# Patient Record
Sex: MALE | Race: BLACK OR AFRICAN AMERICAN | Employment: UNEMPLOYED | URBAN - METROPOLITAN AREA
[De-identification: names, ages, dates, MRNs, and addresses within clinical notes are randomized per-mention and may not be internally consistent; named-entity substitution may affect disease eponyms.]

---

## 2019-06-24 PROBLEM — R52 GENERALIZED PAIN: Status: ACTIVE | Noted: 2019-06-24

## 2019-06-24 PROBLEM — M47.12 CERVICAL SPONDYLOSIS WITH MYELOPATHY: Status: ACTIVE | Noted: 2018-10-19

## 2019-06-24 PROBLEM — R10.9 ABDOMINAL PAIN: Status: ACTIVE | Noted: 2019-06-24

## 2019-06-24 PROBLEM — E05.90 HYPERTHYROIDISM: Status: ACTIVE | Noted: 2019-06-24

## 2022-03-18 PROBLEM — M47.12 CERVICAL SPONDYLOSIS WITH MYELOPATHY: Status: ACTIVE | Noted: 2018-10-19

## 2022-03-18 PROBLEM — R10.9 ABDOMINAL PAIN: Status: ACTIVE | Noted: 2019-06-24

## 2022-03-19 PROBLEM — E05.90 HYPERTHYROIDISM: Status: ACTIVE | Noted: 2019-06-24

## 2022-03-19 PROBLEM — R52 GENERALIZED PAIN: Status: ACTIVE | Noted: 2019-06-24

## 2022-06-16 ENCOUNTER — TRANSCRIBE ORDER (OUTPATIENT)
Dept: SCHEDULING | Age: 67
End: 2022-06-16

## 2022-06-16 DIAGNOSIS — H90.3 SENSORY HEARING LOSS, BILATERAL: Primary | ICD-10-CM

## 2022-06-24 ENCOUNTER — HOSPITAL ENCOUNTER (OUTPATIENT)
Dept: LAB | Age: 67
Discharge: HOME | End: 2022-06-24
Attending: PHYSICIAN ASSISTANT
Payer: MEDICARE

## 2022-06-24 ENCOUNTER — HOSPITAL ENCOUNTER (OUTPATIENT)
Dept: MRI IMAGING | Age: 67
Discharge: HOME | End: 2022-06-24
Attending: PHYSICIAN ASSISTANT
Payer: MEDICARE

## 2022-06-24 ENCOUNTER — TRANSCRIBE ORDER (OUTPATIENT)
Dept: REGISTRATION | Age: 67
End: 2022-06-24

## 2022-06-24 DIAGNOSIS — H90.3 SENSORY HEARING LOSS, BILATERAL: ICD-10-CM

## 2022-06-24 DIAGNOSIS — H90.3 SENSORY HEARING LOSS, BILATERAL: Primary | ICD-10-CM

## 2022-06-24 LAB — CREAT SERPL-MCNC: 1.23 MG/DL (ref 0.6–1.3)

## 2022-06-24 PROCEDURE — 82565 ASSAY OF CREATININE: CPT

## 2022-06-24 PROCEDURE — 36415 COLL VENOUS BLD VENIPUNCTURE: CPT

## 2022-06-24 PROCEDURE — 74011250636 HC RX REV CODE- 250/636: Performed by: PHYSICIAN ASSISTANT

## 2022-06-24 PROCEDURE — 70553 MRI BRAIN STEM W/O & W/DYE: CPT

## 2022-06-24 PROCEDURE — A9576 INJ PROHANCE MULTIPACK: HCPCS | Performed by: PHYSICIAN ASSISTANT

## 2022-06-24 RX ADMIN — GADOTERIDOL 15 ML: 279.3 INJECTION, SOLUTION INTRAVENOUS at 14:20

## 2022-08-26 ENCOUNTER — APPOINTMENT (OUTPATIENT)
Dept: VASCULAR SURGERY | Age: 67
End: 2022-08-26
Attending: EMERGENCY MEDICINE
Payer: MEDICARE

## 2022-08-26 ENCOUNTER — HOSPITAL ENCOUNTER (EMERGENCY)
Age: 67
Discharge: HOME | End: 2022-08-26
Attending: EMERGENCY MEDICINE
Payer: MEDICARE

## 2022-08-26 VITALS
WEIGHT: 154 LBS | DIASTOLIC BLOOD PRESSURE: 66 MMHG | TEMPERATURE: 98.6 F | OXYGEN SATURATION: 99 % | RESPIRATION RATE: 16 BRPM | HEART RATE: 63 BPM | BODY MASS INDEX: 21.56 KG/M2 | SYSTOLIC BLOOD PRESSURE: 131 MMHG | HEIGHT: 71 IN

## 2022-08-26 DIAGNOSIS — M79.605 LEFT LEG PAIN: Primary | ICD-10-CM

## 2022-08-26 PROCEDURE — 93971 EXTREMITY STUDY: CPT

## 2022-08-26 PROCEDURE — 99284 EMERGENCY DEPT VISIT MOD MDM: CPT

## 2022-08-26 NOTE — ED TRIAGE NOTES
PT arrived to ER with C/O left leg swelling and redness. PT reports pain level of 7/10. Symptoms began Wednesday. PT had neck surgery in [place] Monday. Spoke with Surgeon and was advised to come to ER to R/O blood clot.

## 2022-08-26 NOTE — ED PROVIDER NOTES
EMERGENCY DEPARTMENT HISTORY AND PHYSICAL EXAM      Date: 8/26/2022  Patient Name: Tavon York    History of Presenting Illness     Chief Complaint   Patient presents with    Leg Pain       History Provided By: Patient    HPI: Tavon York, 78 y.o. male prostate cancer, chronic back pain, hypothyroidism, recent neck surgery, and patient presents to ED complaining of left leg swelling and pain since yesterday. He however states that the swelling and pain improved today but he called his surgeon who advised him to come to ER for evaluation for blood clot. He has no shortness of breath or chest pain. He denies any trauma to the leg. There are no other complaints, changes, or physical findings at this time. PCP: Noah Mirza MD    No current facility-administered medications on file prior to encounter. Current Outpatient Medications on File Prior to Encounter   Medication Sig Dispense Refill    traZODone (DESYREL) 50 mg tablet take 1 tablet by mouth at bedtime if needed for sleep      tadalafiL (CIALIS) 20 mg tablet Take 1 Tablet by mouth as needed for Erectile Dysfunction. Take no more than 1 pill in 24 hours. 30 Tablet 2    HYDROcodone-acetaminophen (NORCO)  mg tablet Take 1 Tablet by mouth every six (6) hours as needed. gabapentin (NEURONTIN) 300 mg capsule Take 300 mg by mouth three (3) times daily. tamsulosin (FLOMAX) 0.4 mg capsule take 1 capsule by mouth at bedtime      lubiPROStone (AMITIZA) 24 mcg capsule Take 24 mcg by mouth two (2) times daily (with meals). Linzess 145 mcg cap capsule take 1 capsule by mouth AT LEAST 30 MINUTES BEFORE THE FIRST MEAL OF THE DAY ON AN EMPTY STOMACH      mirabegron ER (Myrbetriq) 50 mg ER tablet Take 1 Tablet by mouth daily. 30 Tablet 3    cyclobenzaprine (FLEXERIL) 10 mg tablet Take 10 mg by mouth three (3) times daily as needed.       ergocalciferol (ERGOCALCIFEROL) 1,250 mcg (50,000 unit) capsule Take 50,000 Units by mouth every seven (7) days. famotidine (PEPCID) 20 mg tablet famotidine 20 mg tablet      fluticasone propionate (FLONASE) 50 mcg/actuation nasal spray fluticasone propionate 50 mcg/actuation nasal spray,suspension      doxazosin (CARDURA) 4 mg tablet take 1/2 tablet once daily 90 Tab 0    metoprolol succinate (TOPROL-XL) 25 mg XL tablet metoprolol succinate ER 25 mg tablet,extended release 24 hr      aspirin delayed-release 81 mg tablet Take  by mouth daily. omeprazole (PRILOSEC) 20 mg capsule Take 20 mg by mouth daily. levothyroxine (SYNTHROID) 75 mcg tablet Take  by mouth Daily (before breakfast). [DISCONTINUED] magnesium hydroxide (MILK OF MAGNESIA) 400 mg/5 mL suspension Take 30 mL by mouth every six (6) hours as needed. [DISCONTINUED] methylPREDNISolone (MEDROL) 4 mg tab methylprednisolone 4 mg tablet      [DISCONTINUED] traMADol (ULTRAM) 50 mg tablet Take 50 mg by mouth every six (6) hours as needed for Pain. Past History     Past Medical History:  Past Medical History:   Diagnosis Date    Back pain     Hypothyroidism     Prostate cancer ([facility]      pT2c madelaine 3 + 3 adenocarcinoma of the prostate s/p DVP 5/20/13        Past Surgical History:  Past Surgical History:   Procedure Laterality Date    HX BACK SURGERY  2008    HX OTHER SURGICAL  10/2018    Neck    HX RADICAL PROSTATECTOMY  5/20/13    DVP       Family History:  Family History   Problem Relation Age of Onset    Heart Disease Father     Cancer Maternal Grandmother         pancreatic    Cancer Maternal Grandfather         lung    Heart Disease Paternal Grandfather     Cancer Sister         colon       Social History:  Social History     Tobacco Use    Smoking status: Every Day    Smokeless tobacco: Never   Substance Use Topics    Alcohol use:  Yes     Alcohol/week: 18.0 standard drinks     Types: 18 Cans of beer per week     Comment: occasionally    Drug use: No       Allergies:  No Known Allergies    Review of Systems Review of Systems   All other systems reviewed and are negative. Physical Exam   Physical Exam  Vitals and nursing note reviewed. Constitutional:       General: He is not in acute distress. Appearance: He is well-developed. He is not diaphoretic. Comments: Appears in no acute distress with a soft neck collar in position. HENT:      Head: Normocephalic and atraumatic. No right periorbital erythema or left periorbital erythema. Jaw: No trismus. Right Ear: External ear normal. No drainage or swelling. Tympanic membrane is not perforated, erythematous or bulging. Left Ear: External ear normal. No drainage or swelling. Tympanic membrane is not perforated, erythematous or bulging. Nose: Nose normal. No mucosal edema or rhinorrhea. Right Sinus: No maxillary sinus tenderness or frontal sinus tenderness. Left Sinus: No maxillary sinus tenderness or frontal sinus tenderness. Mouth/Throat:      Mouth: No oral lesions. Dentition: No dental abscesses. Pharynx: Uvula midline. No oropharyngeal exudate, posterior oropharyngeal erythema or uvula swelling. Tonsils: No tonsillar abscesses. Eyes:      General: No scleral icterus. Right eye: No discharge. Left eye: No discharge. Conjunctiva/sclera: Conjunctivae normal.   Cardiovascular:      Rate and Rhythm: Normal rate and regular rhythm. Heart sounds: Normal heart sounds. No murmur heard. No friction rub. No gallop. Pulmonary:      Effort: Pulmonary effort is normal. No tachypnea, accessory muscle usage or respiratory distress. Breath sounds: Normal breath sounds. No decreased breath sounds, wheezing, rhonchi or rales. Abdominal:      General: Bowel sounds are normal. There is no distension. Palpations: Abdomen is soft. Tenderness: There is no abdominal tenderness. Musculoskeletal:         General: Swelling present. No tenderness. Normal range of motion.       Cervical back: Normal range of motion and neck supple. Comments: Left knee with minimal swelling no tenderness. Minimal tenderness to left thigh. Shirlyn Pages' sign is negative. Neurovascularly left lower extremity intact. Lymphadenopathy:      Cervical: No cervical adenopathy. Skin:     General: Skin is warm and dry. Neurological:      Mental Status: He is alert and oriented to person, place, and time. Psychiatric:         Judgment: Judgment normal.       Lab and Diagnostic Study Results   Labs -   No results found for this or any previous visit (from the past 12 hour(s)). Radiologic Studies -   @lastxrresult@  CT Results  (Last 48 hours)      None          CXR Results  (Last 48 hours)      None            Medical Decision Making and ED Course   Differential Diagnosis & Medical Decision Making Provider Note:       - I am the first provider for this patient. I reviewed the vital signs, available nursing notes, past medical history, past surgical history, family history and social history. The patients presenting problems have been discussed, and they are in agreement with the care plan formulated and outlined with them. I have encouraged them to ask questions as they arise throughout their visit. Vital Signs-Reviewed the patient's vital signs. Patient Vitals for the past 12 hrs:   Temp Pulse Resp BP SpO2   08/26/22 0953 98.6 °F (37 °C) 63 16 131/66 99 %       ED Course:          Procedures     Disposition   Disposition: Condition stable  DC- Adult Discharges: All of the diagnostic tests were reviewed and questions answered. Diagnosis, care plan and treatment options were discussed. The patient understands the instructions and will follow up as directed. The patients results have been reviewed with them. They have been counseled regarding their diagnosis.   The patient verbally convey understanding and agreement of the signs, symptoms, diagnosis, treatment and prognosis and additionally agrees to follow up as recommended with their PCP in 24 - 48 hours. They also agree with the care-plan and convey that all of their questions have been answered. I have also put together some discharge instructions for them that include: 1) educational information regarding their diagnosis, 2) how to care for their diagnosis at home, as well a 3) list of reasons why they would want to return to the ED prior to their follow-up appointment, should their condition change. DISCHARGE PLAN:  1. Cannot display discharge medications since this patient is not currently admitted. 2.   Follow-up Information       Follow up With Specialties Details Why Contact Kaylah Sutton MD Family Medicine  As Scheduled [address]  [phone]            3. Return to ED if worse   4. Discharge Medication List as of 8/26/2022 11:51 AM         Remove if admitted/transferred    Diagnosis/Clinical Impression     Clinical Impression:   1. Left leg pain        Attestations: IEduar MD, am the primary clinician of record. Please note that this dictation was completed with [employer], the computer voice recognition software. Quite often unanticipated grammatical, syntax, homophones, and other interpretive errors are inadvertently transcribed by the computer software. Please disregard these errors. Please excuse any errors that have escaped final proofreading. Thank you.

## 2022-10-18 ENCOUNTER — OFFICE VISIT (OUTPATIENT)
Dept: ORTHOPEDIC SURGERY | Age: 67
End: 2022-10-18
Payer: MEDICARE

## 2022-10-18 VITALS — WEIGHT: 154 LBS | HEIGHT: 71 IN | BODY MASS INDEX: 21.56 KG/M2

## 2022-10-18 DIAGNOSIS — M79.645 BILATERAL THUMB PAIN: Primary | ICD-10-CM

## 2022-10-18 DIAGNOSIS — M18.0 OSTEOARTHRITIS OF CARPOMETACARPAL (CMC) JOINTS OF BOTH THUMBS, UNSPECIFIED OSTEOARTHRITIS TYPE: ICD-10-CM

## 2022-10-18 DIAGNOSIS — M79.644 BILATERAL THUMB PAIN: Primary | ICD-10-CM

## 2022-10-18 PROCEDURE — G8427 DOCREV CUR MEDS BY ELIG CLIN: HCPCS | Performed by: ORTHOPAEDIC SURGERY

## 2022-10-18 PROCEDURE — 1101F PT FALLS ASSESS-DOCD LE1/YR: CPT | Performed by: ORTHOPAEDIC SURGERY

## 2022-10-18 PROCEDURE — 1123F ACP DISCUSS/DSCN MKR DOCD: CPT | Performed by: ORTHOPAEDIC SURGERY

## 2022-10-18 PROCEDURE — G8432 DEP SCR NOT DOC, RNG: HCPCS | Performed by: ORTHOPAEDIC SURGERY

## 2022-10-18 PROCEDURE — G8536 NO DOC ELDER MAL SCRN: HCPCS | Performed by: ORTHOPAEDIC SURGERY

## 2022-10-18 PROCEDURE — 20604 DRAIN/INJ JOINT/BURSA W/US: CPT | Performed by: ORTHOPAEDIC SURGERY

## 2022-10-18 PROCEDURE — 3017F COLORECTAL CA SCREEN DOC REV: CPT | Performed by: ORTHOPAEDIC SURGERY

## 2022-10-18 PROCEDURE — G8420 CALC BMI NORM PARAMETERS: HCPCS | Performed by: ORTHOPAEDIC SURGERY

## 2022-10-18 PROCEDURE — 99203 OFFICE O/P NEW LOW 30 MIN: CPT | Performed by: ORTHOPAEDIC SURGERY

## 2022-10-18 RX ORDER — LIDOCAINE HYDROCHLORIDE 10 MG/ML
1 INJECTION INFILTRATION; PERINEURAL ONCE
Status: COMPLETED | OUTPATIENT
Start: 2022-10-18 | End: 2022-10-18

## 2022-10-18 RX ORDER — OXYCODONE AND ACETAMINOPHEN 5; 325 MG/1; MG/1
TABLET ORAL
COMMUNITY
Start: 2022-10-06

## 2022-10-18 RX ORDER — TRIAMCINOLONE ACETONIDE 40 MG/ML
40 INJECTION, SUSPENSION INTRA-ARTICULAR; INTRAMUSCULAR ONCE
Status: COMPLETED | OUTPATIENT
Start: 2022-10-18 | End: 2022-10-18

## 2022-10-18 RX ORDER — LORATADINE 10 MG/1
10 TABLET ORAL DAILY
COMMUNITY

## 2022-10-18 RX ORDER — OMEPRAZOLE 40 MG/1
CAPSULE, DELAYED RELEASE ORAL
COMMUNITY
Start: 2022-08-18

## 2022-10-18 RX ORDER — OMEGA-3S/DHA/EPA/FISH OIL/D3 300MG-1000
1 CAPSULE ORAL DAILY
COMMUNITY
Start: 2022-10-05

## 2022-10-18 RX ORDER — METHOCARBAMOL 500 MG/1
TABLET, FILM COATED ORAL
COMMUNITY
Start: 2022-08-23

## 2022-10-18 RX ADMIN — TRIAMCINOLONE ACETONIDE 40 MG: 40 INJECTION, SUSPENSION INTRA-ARTICULAR; INTRAMUSCULAR at 12:00

## 2022-10-18 RX ADMIN — LIDOCAINE HYDROCHLORIDE 1 ML: 10 INJECTION INFILTRATION; PERINEURAL at 12:00

## 2022-10-18 NOTE — LETTER
10/19/2022    Patient: Aniceto Segal   YOB: 1955   Date of Visit: 10/18/2022     Richerd Scheuermann, MD  [facility] [address]  Via Fax: [phone]    Dear Richerd Scheuermann, MD,      Thank you for referring Mr. Efrain Stahl to [address] [place] [address] for evaluation. My notes for this consultation are attached. If you have questions, please do not hesitate to call me. I look forward to following your patient along with you.       Sincerely,    Barbara Ricci MD

## 2022-10-18 NOTE — PATIENT INSTRUCTIONS
Foot Pain: Care Instructions  Your Care Instructions     Foot injuries that cause pain and swelling are fairly common. Almost all sports or home repair projects can cause a misstep that ends up as foot pain. Normal wear and tear, especially as you get older, also can cause foot pain. Most minor foot injuries will heal on their own, and home treatment is usually all you need to do. If you have a severe injury, you may need tests and treatment. Follow-up care is a key part of your treatment and safety. Be sure to make and go to all appointments, and call your doctor if you are having problems. It's also a good idea to know your test results and keep a list of the medicines you take. How can you care for yourself at home? Take pain medicines exactly as directed. If the doctor gave you a prescription medicine for pain, take it as prescribed. If you are not taking a prescription pain medicine, ask your doctor if you can take an over-the-counter medicine. Rest and protect your foot. Take a break from any activity that may cause pain. Put ice or a cold pack on your foot for 10 to 20 minutes at a time. Put a thin cloth between the ice and your skin. Prop up the sore foot on a pillow when you ice it or anytime you sit or lie down during the next 3 days. Try to keep it above the level of your heart. This will help reduce swelling. Your doctor may recommend that you wrap your foot with an elastic bandage. Keep your foot wrapped for as long as your doctor advises. If your doctor recommends crutches, use them as directed. Wear roomy footwear. As soon as pain and swelling end, begin gentle exercises of your foot. Your doctor can tell you which exercises will help. When should you call for help? Call 911 anytime you think you may need emergency care. For example, call if:    Your foot turns pale, white, blue, or cold.    Call your doctor now or seek immediate medical care if:    You cannot move or stand on your foot.     Your foot looks twisted or out of its normal position. Your foot is not stable when you step down. You have signs of infection, such as: Increased pain, swelling, warmth, or redness. Red streaks leading from the sore area. Pus draining from a place on your foot. A fever. Your foot is numb or tingly. Watch closely for changes in your health, and be sure to contact your doctor if:    You do not get better as expected. You have bruises from an injury that last longer than 2 weeks. Where can you learn more? Go to http://www.thurman.com/  Enter D999 in the search box to learn more about \"Foot Pain: Care Instructions. \"  Current as of: July 1, 2021               Content Version: 13.2  © 2006-2022 [employer]. Care instructions adapted under license by [employer] (which disclaims liability or warranty for this information). If you have questions about a medical condition or this instruction, always ask your healthcare professional. [place] [address] any warranty or liability for your use of this information.

## 2022-10-18 NOTE — LETTER
Destiny Busby   1955   [id number]       10/18/2022       I hereby authorize and direct Edd Mancia MD, Garcia Anthony, and whomever he may designate as his associate to perform upon myself the following procedure:    Injection of: Kenalog,bl thumb rm 6    If any unforeseen condition arises in the course of the procedure, I further authorize him and his associated and/or assistant(s) to do whatever he/she deems advisable. The nature, purpose, benefits, risks, side effects, likelihood of achieving goals, and potential problems that might occur during recuperation, risks for not receiving the proposed care, treatment and services and alternatives of the procedure have been fully explained to me by my physician including, but not limited to:    Swelling, joint pain, skin pigment changes, worsening of condition, and failure to improve. I acknowledge that no guarantee or assurance has been made to me as to the results that may be obtained or the likelihood of success.                 _______________________________________     Signature of patient or authorized representative                [employer] [facility] fax: [phone]

## 2022-10-18 NOTE — PROGRESS NOTES
Name: Felix De La Rosa    : 1955     Service Dept: [address] [place] [address]    Chief Complaint   Patient presents with    Hand Pain        Visit Vitals  Ht 5' 11\" (1.803 m)   Wt 154 lb (69.9 kg)   BMI 21.48 kg/m²        No Known Allergies     Current Outpatient Medications   Medication Sig Dispense Refill    Vitamin D3 50 mcg (2,000 unit) tab Take 1 Tablet by mouth daily. loratadine (CLARITIN) 10 mg tablet Take 10 mg by mouth daily. methocarbamoL (ROBAXIN) 500 mg tablet       oxyCODONE-acetaminophen (PERCOCET) 5-325 mg per tablet take 1 tablet by mouth every 6 hours if needed      omeprazole (PRILOSEC) 40 mg capsule TAKE 1 TABLET BY MOUTH 30 MINUTES BEFORE MORNING MEAL ONCE DAILY      traZODone (DESYREL) 50 mg tablet take 1 tablet by mouth at bedtime if needed for sleep      tadalafiL (CIALIS) 20 mg tablet Take 1 Tablet by mouth as needed for Erectile Dysfunction. Take no more than 1 pill in 24 hours. 30 Tablet 2    HYDROcodone-acetaminophen (NORCO)  mg tablet Take 1 Tablet by mouth every six (6) hours as needed. gabapentin (NEURONTIN) 300 mg capsule Take 300 mg by mouth three (3) times daily. tamsulosin (FLOMAX) 0.4 mg capsule take 1 capsule by mouth at bedtime      lubiPROStone (AMITIZA) 24 mcg capsule Take 24 mcg by mouth two (2) times daily (with meals). Linzess 145 mcg cap capsule take 1 capsule by mouth AT LEAST 30 MINUTES BEFORE THE FIRST MEAL OF THE DAY ON AN EMPTY STOMACH      mirabegron ER (Myrbetriq) 50 mg ER tablet Take 1 Tablet by mouth daily. 30 Tablet 3    cyclobenzaprine (FLEXERIL) 10 mg tablet Take 10 mg by mouth three (3) times daily as needed. ergocalciferol (ERGOCALCIFEROL) 1,250 mcg (50,000 unit) capsule Take 50,000 Units by mouth every seven (7) days.       famotidine (PEPCID) 20 mg tablet famotidine 20 mg tablet      fluticasone propionate (FLONASE) 50 mcg/actuation nasal spray fluticasone propionate 50 mcg/actuation nasal spray,suspension      doxazosin (CARDURA) 4 mg tablet take 1/2 tablet once daily 90 Tab 0    metoprolol succinate (TOPROL-XL) 25 mg XL tablet metoprolol succinate ER 25 mg tablet,extended release 24 hr      aspirin delayed-release 81 mg tablet Take  by mouth daily. omeprazole (PRILOSEC) 20 mg capsule Take 20 mg by mouth daily. levothyroxine (SYNTHROID) 75 mcg tablet Take  by mouth Daily (before breakfast). Current Facility-Administered Medications   Medication Dose Route Frequency Provider Last Rate Last Admin    [COMPLETED] triamcinolone acetonide (KENALOG-40) 40 mg/mL injection 40 mg  40 mg Other ONCE Edd Bowie MD   40 mg at 10/18/22 1200    [COMPLETED] lidocaine (XYLOCAINE) 10 mg/mL (1 %) injection 1 mL  1 mL Other ONCE Edd Bowie MD   1 mL at 10/18/22 1200      Patient Active Problem List   Diagnosis Code    [facility] C61    Abdominal pain R10.9    Cervical spondylosis with myelopathy M47.12    Generalized pain R52    Hyperthyroidism E05.90    Pneumonia, organism unspecified(486) J18.9    Sepsis ([facility] A41.9      Family History   Problem Relation Age of Onset    Heart Disease Father     Cancer Maternal Grandmother         pancreatic    Cancer Maternal Grandfather         lung    Heart Disease Paternal Grandfather     Cancer Sister         colon      Social History     Socioeconomic History    Marital status: SINGLE   Tobacco Use    Smoking status: Every Day    Smokeless tobacco: Never   Substance and Sexual Activity    Alcohol use:  Yes     Alcohol/week: 18.0 standard drinks     Types: 18 Cans of beer per week     Comment: occasionally    Drug use: No      Past Surgical History:   Procedure Laterality Date    HX BACK SURGERY  2008    HX OTHER SURGICAL  10/2018    Neck    HX RADICAL PROSTATECTOMY  5/20/13    DVP      Past Medical History:   Diagnosis Date    Back pain     Hypothyroidism     Pro[facility] ([facility]      pT2c madelaine 3 + 3 adenocarcinoma of the prostate s/p DVP 5/20/13         I have reviewed and agree with [address] [place] [address] Nw and ROS and intake form in chart and the record furthermore I have reviewed prior medical record(s) regarding this patients care during this appointment. Review of Systems:   Patient is a pleasant appearing individual, appropriately dressed, well hydrated, well nourished, who is alert, appropriately oriented for age, and in no acute distress with a normal gait and normal affect who does not appear to be in any significant pain. Physical Exam:  Right hand- Grossly neurovascularly intact with good cap refill, decreased range of motion, decreased strength, positive crepitation, minimal swelling, skin intact with no skin lesions, no erythema, no echymosis, no point tenderness. Left hand -  Grossly neurovascularly intact with good cap refill, decreased range of motion, decreased strength, positive crepitation, minimal swelling, skin intact with no skin lesions, no erythema, no echymosis, no point tenderness. Procedure Documentation:    Please note that [employer] ultrasound was used to perform an ultrasound guided injection into the bilateral fingers. A pre-injection ultrasound was taken of bilateral fingers. After the needle was placed into the lateral aspect of the finger(s) and while the kenalog was injected another ultrasound picture confirmed the appropriate placement. The site of injection, bilateral fingers was sterilely prepped. The injection of 40 mg Kenalog and Lidocaine was administered appropriately in bilateral fingers and the patient tolerated it well. No site reaction was identified. Appropriate dressing was placed. Consent was obtained for the injection. Encounter Diagnoses     ICD-10-CM ICD-9-CM   1. Sprain of right foot, initial encounter  S93.601A 845.10   2. Right foot pain  M79.671 729.5       HPI:  The patient is here with a chief complaint of bilateral hand pain, throbbing, burning pain, having some numbness and tingling. Pain is 6/10. X-rays are positive for [facility] arthritis of both hands, as well. Assessment/Plan:  Plan at this point, cortisone injection bilateral thumb CMC joint. If the injection helps, that is all we need to do. If it does not, we will consider getting an EMG. We will see him back in 2 to 3 weeks and go from there. As part of continued conservative pain management options the patient was advised to utilize Tylenol or OTC NSAIDS as long as it is not medically contraindicated. Return to Office: Follow-up and Dispositions    Return if symptoms worsen or fail to improve. Administrations This Visit       lidocaine (XYLOCAINE) 10 mg/mL (1 %) injection 1 mL       Admin Date  10/18/2022 Action  Given Dose  1 mL Route  Other Administered By  Pilar Hoffman LPN              triamcinolone acetonide (KENALOG-40) 40 mg/mL injection 40 mg       Admin Date  10/18/2022 Action  Given Dose  40 mg Route  Other Administered By  Pilar Hoffman LPN                   Scribed by Amy Feliz LPN as dictated by [facility] MAX Avelar MD.  Documentation True and Accepted Edd Avelar MD

## 2022-11-02 ENCOUNTER — HOSPITAL ENCOUNTER (EMERGENCY)
Age: 67
Discharge: HOME | End: 2022-11-02
Attending: INTERNAL MEDICINE
Payer: MEDICARE

## 2022-11-02 ENCOUNTER — APPOINTMENT (OUTPATIENT)
Dept: CT IMAGING | Age: 67
End: 2022-11-02
Attending: INTERNAL MEDICINE
Payer: MEDICARE

## 2022-11-02 VITALS
TEMPERATURE: 98.4 F | WEIGHT: 154 LBS | DIASTOLIC BLOOD PRESSURE: 68 MMHG | RESPIRATION RATE: 14 BRPM | BODY MASS INDEX: 21.56 KG/M2 | HEIGHT: 71 IN | HEART RATE: 66 BPM | OXYGEN SATURATION: 100 % | SYSTOLIC BLOOD PRESSURE: 144 MMHG

## 2022-11-02 DIAGNOSIS — R10.31 ABDOMINAL PAIN, RIGHT LOWER QUADRANT: Primary | ICD-10-CM

## 2022-11-02 DIAGNOSIS — K59.00 CONSTIPATION, UNSPECIFIED CONSTIPATION TYPE: ICD-10-CM

## 2022-11-02 LAB
APPEARANCE UR: CLEAR
BACTERIA URNS QL MICRO: NEGATIVE /HPF
BILIRUB UR QL: NEGATIVE
COLOR UR: YELLOW
GLUCOSE UR STRIP.AUTO-MCNC: NEGATIVE MG/DL
HGB UR QL STRIP: NEGATIVE
KETONES UR QL STRIP.AUTO: NEGATIVE MG/DL
LEUKOCYTE ESTERASE UR QL STRIP.AUTO: NEGATIVE
NITRITE UR QL STRIP.AUTO: NEGATIVE
PH UR STRIP: 5 [PH] (ref 5–8)
PROT UR STRIP-MCNC: ABNORMAL MG/DL
RBC #/AREA URNS HPF: ABNORMAL /HPF (ref 0–2)
SP GR UR REFRACTOMETRY: 1.01 (ref 1–1.03)
UROBILINOGEN UR QL STRIP.AUTO: 0.2 EU/DL (ref 0.2–1)
WBC URNS QL MICRO: ABNORMAL /HPF (ref 0–4)

## 2022-11-02 PROCEDURE — 99284 EMERGENCY DEPT VISIT MOD MDM: CPT

## 2022-11-02 PROCEDURE — 74176 CT ABD & PELVIS W/O CONTRAST: CPT

## 2022-11-02 PROCEDURE — 81001 URINALYSIS AUTO W/SCOPE: CPT

## 2022-11-02 RX ORDER — POLYETHYLENE GLYCOL 3350 17 G/17G
17 POWDER, FOR SOLUTION ORAL 2 TIMES DAILY
Qty: 235 G | Refills: 0 | Status: SHIPPED | OUTPATIENT
Start: 2022-11-02

## 2022-11-02 NOTE — ED NOTES
Patient up to [facility]. Small amount of formed stool with brown water in bucket.

## 2022-11-02 NOTE — ED PROVIDER NOTES
EMERGENCY DEPARTMENT HISTORY AND PHYSICAL EXAM      Date: 11/2/2022  Patient Name: Vinny Iraheta    History of Presenting Illness     Chief Complaint   Patient presents with    Constipation     BM yesterday but \" I had to strain\". History Provided By: Patient    HPI: Vinny Iraheta, 78 y.o. male with history of cervical fusion, prostate cancer  S/P RARP 5/'13; LUTS; lumbar discectomy / laminectomy, hypothyroid, GERD presents with chronic constipation. Patient states that over the last week whenever he strains to move his bowels he develops pain from the right groin into the right lower quadrant of the abdomen which stops after he no longer strains. He denies fever chills, blood in the stool, dysuria, flank pain  There are no other complaints, changes, or physical findings at this time. PCP: Cara Rodriguez MD    Current Outpatient Medications   Medication Sig Dispense Refill    polyethylene glycol (Miralax) 17 gram/dose powder Take 17 g by mouth two (2) times a day. 1 tablespoon with 8 oz of water daily 235 g 0    Vitamin D3 50 mcg (2,000 unit) tab Take 1 Tablet by mouth daily. loratadine (CLARITIN) 10 mg tablet Take 10 mg by mouth daily. methocarbamoL (ROBAXIN) 500 mg tablet       oxyCODONE-acetaminophen (PERCOCET) 5-325 mg per tablet take 1 tablet by mouth every 6 hours if needed      omeprazole (PRILOSEC) 40 mg capsule TAKE 1 TABLET BY MOUTH 30 MINUTES BEFORE MORNING MEAL ONCE DAILY      traZODone (DESYREL) 50 mg tablet take 1 tablet by mouth at bedtime if needed for sleep      tadalafiL (CIALIS) 20 mg tablet Take 1 Tablet by mouth as needed for Erectile Dysfunction. Take no more than 1 pill in 24 hours. 30 Tablet 2    HYDROcodone-acetaminophen (NORCO)  mg tablet Take 1 Tablet by mouth every six (6) hours as needed. gabapentin (NEURONTIN) 300 mg capsule Take 300 mg by mouth three (3) times daily.       tamsulosin (FLOMAX) 0.4 mg capsule take 1 capsule by mouth at bedtime lubiPROStone (AMITIZA) 24 mcg capsule Take 24 mcg by mouth two (2) times daily (with meals). Linzess 145 mcg cap capsule take 1 capsule by mouth AT LEAST 30 MINUTES BEFORE THE FIRST MEAL OF THE DAY ON AN EMPTY STOMACH      mirabegron ER (Myrbetriq) 50 mg ER tablet Take 1 Tablet by mouth daily. 30 Tablet 3    cyclobenzaprine (FLEXERIL) 10 mg tablet Take 10 mg by mouth three (3) times daily as needed. ergocalciferol (ERGOCALCIFEROL) 1,250 mcg (50,000 unit) capsule Take 50,000 Units by mouth every seven (7) days. famotidine (PEPCID) 20 mg tablet famotidine 20 mg tablet      fluticasone propionate (FLONASE) 50 mcg/actuation nasal spray fluticasone propionate 50 mcg/actuation nasal spray,suspension      doxazosin (CARDURA) 4 mg tablet take 1/2 tablet once daily 90 Tab 0    metoprolol succinate (TOPROL-XL) 25 mg XL tablet metoprolol succinate ER 25 mg tablet,extended release 24 hr      aspirin delayed-release 81 mg tablet Take  by mouth daily. omeprazole (PRILOSEC) 20 mg capsule Take 20 mg by mouth daily. levothyroxine (SYNTHROID) 75 mcg tablet Take  by mouth Daily (before breakfast).          Past History   Past Medical History:  Past Medical History:   Diagnosis Date    Back pain     Hypothyroidism     Prostate cancer ([facility] [address]      pT2c madelaine 3 + 3 adenocarcinoma of the prostate s/p DVP 5/20/13        Past Surgical History:  Past Surgical History:   Procedure Laterality Date    HX BACK SURGERY  2008    HX OTHER SURGICAL  10/2018    Neck    HX RADICAL PROSTATECTOMY  5/20/13    DVP       Family History:  Family History   Problem Relation Age of Onset    Heart Disease Father     Cancer Maternal Grandmother         pancreatic    Cancer Maternal Grandfather         lung    Heart Disease Paternal Grandfather     Cancer Sister         colon       Social History:  Social History     Tobacco Use    Smoking status: Every Day     Packs/day: 0.50     Types: Cigarettes    Smokeless tobacco: Never Substance Use Topics    Alcohol use: Yes     Alcohol/week: 18.0 standard drinks     Types: 18 Cans of beer per week     Comment: occasionally    Drug use: No       Allergies:  No Known Allergies  Review of Systems   Review of Systems   Constitutional:  Negative for chills and fever. HENT:  Negative for sore throat. Respiratory:  Negative for shortness of breath. Cardiovascular:  Negative for chest pain. Gastrointestinal:  Positive for abdominal pain. Negative for nausea and vomiting. Genitourinary:  Negative for dysuria, flank pain and frequency. Skin:  Negative for rash. Neurological:  Negative for headaches. Psychiatric/Behavioral:  Negative for confusion. Physical Exam   Physical Exam  Vitals and nursing note reviewed. Constitutional:       Appearance: He is well-developed. He is not diaphoretic. HENT:      Head: Normocephalic. Mouth/Throat:      Pharynx: No oropharyngeal exudate. Eyes:      Extraocular Movements: Extraocular movements intact. Conjunctiva/sclera: Conjunctivae normal.   Cardiovascular:      Rate and Rhythm: Normal rate and regular rhythm. Heart sounds: Normal heart sounds. No murmur heard. Pulmonary:      Effort: Pulmonary effort is normal. No respiratory distress. Breath sounds: No wheezing. Abdominal:      General: Bowel sounds are normal. There is no distension. Palpations: Abdomen is soft. Tenderness: There is no abdominal tenderness. There is no guarding or rebound. Genitourinary:     Comments: Left testicle is normal but the right testicle is atrophic, no hernia  Musculoskeletal:         General: Normal range of motion. Cervical back: Normal range of motion and neck supple. Right lower leg: No edema. Left lower leg: No edema. Skin:     General: Skin is warm and dry. Neurological:      Mental Status: He is alert and oriented to person, place, and time.        Lab and Diagnostic Study Results   Labs - Recent Results (from the past 12 hour(s))   URINALYSIS W/ RFLX MICROSCOPIC    Collection Time: 11/02/22 10:25 AM   Result Value Ref Range    Color Yellow      Appearance Clear      Specific gravity 1.014 1.005 - 1.030      pH (UA) 5.0 5.0 - 8.0      Protein Trace (A) Negative mg/dL    Glucose Negative Negative mg/dL    Ketone Negative Negative mg/dL    Bilirubin Negative Negative      Blood Negative Negative      Urobilinogen 0.2 0.2 - 1.0 EU/dL    Nitrites Negative Negative      Leukocyte Esterase Negative Negative     URINE MICROSCOPIC    Collection Time: 11/02/22 10:25 AM   Result Value Ref Range    WBC 0-4 0 - 4 /hpf    RBC 0-5 0 - 2 /hpf    Bacteria Negative (A) None /hpf       Radiologic Studies -   [unfilled]  CT Results  (Last 48 hours)                 11/02/22 1146  CT ABD PELV WO CONT Final result    Impression:      1. No clearly acute intra-abdominal or pelvic abnormality demonstrated. 2. 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 3. Small hiatal hernia. 4. Cholelithiasis. No evidence of cholecystitis. Narrative:  EXAM: CT of the abdomen and pelvis       INDICATION: Right lower quadrant abdominal pain       COMPARISON: None. TECHNIQUE: Axial CT imaging of the abdomen and pelvis was performed without   intravenous contrast. Multiplanar reformats were generated. One or more dose reduction techniques were used on this CT: automated exposure   control, adjustment of the mAs and/or kVp according to patient size, and   iterative reconstruction techniques. The specific techniques used on this CT   exam have been documented in the patient's electronic medical record.   Digital   Imaging and Communications in Medicine (DICOM) format image data are available   to nonaffiliated external healthcare facilities or entities on a secure, media   free, reciprocally searchable basis with patient authorization for at least a   12-month period after this study. _______________       FINDINGS:       LOWER CHEST: Mild bibasilar atelectasis. No consolidation or pleural   abnormality. LIVER, BILIARY: Liver is normal. No biliary dilation. Punctate gallstone within   a nondistended gallbladder. PANCREAS: Normal as visualized. SPLEEN: Normal.       ADRENALS: Normal.       KIDNEYS/URETERS/BLADDER: No evidence of hydronephrosis. No nephrolithiasis. No   suspicious renal lesion. Urinary bladder mildly thick-walled, otherwise   unremarkable. PELVIC ORGANS: Unremarkable. VASCULATURE: Unremarkable       LYMPH NODES: No enlarged lymph nodes. GASTROINTESTINAL TRACT: No bowel dilation or wall thickening. Small hiatal   hernia. Normal appendix. Multiple colonic diverticula without evidence of   diverticulitis. BONES: Prior posterior instrumented thoracolumbar fusion procedure with   bilateral iliac bolt extension. Vertebral body heights are within normal limits. Bilateral femoral head avascular necrosis with advanced secondary osteoarthritis   left hip joint. OTHER: None.       _______________                 CXR Results  (Last 48 hours)      None            Medical Decision Making and ED Course   Differential Diagnosis & Medical Decision Making Provider Note:   Differential Diagnosis: Constipation, food poisoning, ulcer disease, Gall stones, abdominal aneurysm, mesenteric ischemia, bowel obstruction, biliary disease, pancreatitis, kidney stones, PID, IBD, appendicitis, UTI, renal colic, diverticulitis, colitis, hepatitis, angina, pneumonia, reflux, perforation, testicular torsion.        - I am the first and primary provider for this patient. I reviewed the vital signs, available nursing notes, past medical history, past surgical history, family history and social history.  The patient's presenting problems have been discussed, and the staff are in agreement with the care plan formulated and outlined with them. I have encouraged them to ask questions as they arise throughout their visit. Vital Signs-Reviewed the patient's vital signs. Patient Vitals for the past 12 hrs:   Temp Pulse Resp BP SpO2   11/02/22 1035 98.4 °F (36.9 °C) 62 14 (!) 161/72 100 %       EKG interpretation: (Preliminary): EKG Interpreted by me. Shows     ED Course: FOLLOW UP WITH PCP     Procedures and Critical Care         Disposition   Disposition: DISCHARGE    DISCHARGE PLAN:  1. Current Discharge Medication List        CONTINUE these medications which have NOT CHANGED    Details   Vitamin D3 50 mcg (2,000 unit) tab Take 1 Tablet by mouth daily. loratadine (CLARITIN) 10 mg tablet Take 10 mg by mouth daily. methocarbamoL (ROBAXIN) 500 mg tablet       oxyCODONE-acetaminophen (PERCOCET) 5-325 mg per tablet take 1 tablet by mouth every 6 hours if needed      !! omeprazole (PRILOSEC) 40 mg capsule TAKE 1 TABLET BY MOUTH 30 MINUTES BEFORE MORNING MEAL ONCE DAILY      traZODone (DESYREL) 50 mg tablet take 1 tablet by mouth at bedtime if needed for sleep      tadalafiL (CIALIS) 20 mg tablet Take 1 Tablet by mouth as needed for Erectile Dysfunction. Take no more than 1 pill in 24 hours. Qty: 30 Tablet, Refills: 2      HYDROcodone-acetaminophen (NORCO)  mg tablet Take 1 Tablet by mouth every six (6) hours as needed. gabapentin (NEURONTIN) 300 mg capsule Take 300 mg by mouth three (3) times daily. tamsulosin (FLOMAX) 0.4 mg capsule take 1 capsule by mouth at bedtime      lubiPROStone (AMITIZA) 24 mcg capsule Take 24 mcg by mouth two (2) times daily (with meals). Linzess 145 mcg cap capsule take 1 capsule by mouth AT LEAST 30 MINUTES BEFORE THE FIRST MEAL OF THE DAY ON AN EMPTY STOMACH      mirabegron ER (Myrbetriq) 50 mg ER tablet Take 1 Tablet by mouth daily. Qty: 30 Tablet, Refills: 3      cyclobenzaprine (FLEXERIL) 10 mg tablet Take 10 mg by mouth three (3) times daily as needed. ergocalciferol (ERGOCALCIFEROL) 1,250 mcg (50,000 unit) capsule Take 50,000 Units by mouth every seven (7) days. famotidine (PEPCID) 20 mg tablet famotidine 20 mg tablet      fluticasone propionate (FLONASE) 50 mcg/actuation nasal spray fluticasone propionate 50 mcg/actuation nasal spray,suspension      doxazosin (CARDURA) 4 mg tablet take 1/2 tablet once daily  Qty: 90 Tab, Refills: 0      metoprolol succinate (TOPROL-XL) 25 mg XL tablet metoprolol succinate ER 25 mg tablet,extended release 24 hr      aspirin delayed-release 81 mg tablet Take  by mouth daily. !! omeprazole (PRILOSEC) 20 mg capsule Take 20 mg by mouth daily. levothyroxine (SYNTHROID) 75 mcg tablet Take  by mouth Daily (before breakfast). !! - Potential duplicate medications found. Please discuss with provider. 2.   Follow-up Information       Follow up With Specialties Details Why Contact Info    Kristy Brown MD Internal Medicine Physician Schedule an appointment as soon as possible for a visit in 2 days  [address] [place] [address] [address] [place] [address] Ave  [phone]            3. Return to ED if worse   4. Current Discharge Medication List        START taking these medications    Details   polyethylene glycol (Miralax) 17 gram/dose powder Take 17 g by mouth two (2) times a day. 1 tablespoon with 8 oz of water daily  Qty: 235 g, Refills: 0  Start date: 11/2/2022               Diagnosis/Clinical Impression     Clinical Impression:   1. Abdominal pain, right lower quadrant    2. Constipation, unspecified constipation type        Attestations: Michael Ramirez MD, am the primary clinician of record. Please note that this dictation was completed with [employer], the [employer] voice recognition software. Quite often unanticipated grammatical, syntax, homophones, and other interpretive errors are inadvertently transcribed by the computer software. Please disregard these errors.   Please excuse any errors that have escaped final proofreading. Thank you.

## 2022-11-02 NOTE — ED TRIAGE NOTES
Reports constipation but also states that he had a normal BM yesterday. Reports \"straining\". Very vague symptoms.

## 2022-12-13 ENCOUNTER — APPOINTMENT (OUTPATIENT)
Dept: CT IMAGING | Age: 67
End: 2022-12-13
Attending: EMERGENCY MEDICINE
Payer: MEDICARE

## 2022-12-13 ENCOUNTER — APPOINTMENT (OUTPATIENT)
Dept: GENERAL RADIOLOGY | Age: 67
End: 2022-12-13
Attending: EMERGENCY MEDICINE
Payer: MEDICARE

## 2022-12-13 ENCOUNTER — HOSPITAL ENCOUNTER (EMERGENCY)
Age: 67
Discharge: HOME | End: 2022-12-13
Attending: EMERGENCY MEDICINE
Payer: MEDICARE

## 2022-12-13 VITALS
HEIGHT: 71 IN | OXYGEN SATURATION: 97 % | HEART RATE: 67 BPM | TEMPERATURE: 98.2 F | BODY MASS INDEX: 21.84 KG/M2 | DIASTOLIC BLOOD PRESSURE: 85 MMHG | RESPIRATION RATE: 16 BRPM | WEIGHT: 156 LBS | SYSTOLIC BLOOD PRESSURE: 147 MMHG

## 2022-12-13 DIAGNOSIS — S32.010A CLOSED COMPRESSION FRACTURE OF BODY OF L1 VERTEBRA (HCC): ICD-10-CM

## 2022-12-13 DIAGNOSIS — S40.012A CONTUSION OF LEFT SHOULDER, INITIAL ENCOUNTER: Primary | ICD-10-CM

## 2022-12-13 DIAGNOSIS — M47.816 OSTEOARTHRITIS OF LUMBAR SPINE, UNSPECIFIED SPINAL OSTEOARTHRITIS COMPLICATION STATUS: ICD-10-CM

## 2022-12-13 PROCEDURE — 99284 EMERGENCY DEPT VISIT MOD MDM: CPT

## 2022-12-13 PROCEDURE — 74011250636 HC RX REV CODE- 250/636: Performed by: EMERGENCY MEDICINE

## 2022-12-13 PROCEDURE — 73030 X-RAY EXAM OF SHOULDER: CPT

## 2022-12-13 PROCEDURE — 72131 CT LUMBAR SPINE W/O DYE: CPT

## 2022-12-13 PROCEDURE — 96372 THER/PROPH/DIAG INJ SC/IM: CPT

## 2022-12-13 PROCEDURE — 72100 X-RAY EXAM L-S SPINE 2/3 VWS: CPT

## 2022-12-13 RX ORDER — LIDOCAINE 4 G/100G
PATCH TOPICAL
Qty: 10 PATCH | Refills: 0 | Status: SHIPPED | OUTPATIENT
Start: 2022-12-13

## 2022-12-13 RX ORDER — IBUPROFEN 400 MG/1
400 TABLET ORAL
Qty: 20 TABLET | Refills: 0 | Status: SHIPPED | OUTPATIENT
Start: 2022-12-13

## 2022-12-13 RX ORDER — CYCLOBENZAPRINE HCL 5 MG
5 TABLET ORAL 2 TIMES DAILY
Qty: 10 TABLET | Refills: 0 | Status: SHIPPED | OUTPATIENT
Start: 2022-12-13

## 2022-12-13 RX ORDER — KETOROLAC TROMETHAMINE 30 MG/ML
30 INJECTION, SOLUTION INTRAMUSCULAR; INTRAVENOUS
Status: COMPLETED | OUTPATIENT
Start: 2022-12-13 | End: 2022-12-13

## 2022-12-13 RX ADMIN — KETOROLAC TROMETHAMINE 30 MG: 30 INJECTION, SOLUTION INTRAMUSCULAR; INTRAVENOUS at 17:53
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
## 2023-01-17 ENCOUNTER — OFFICE VISIT (OUTPATIENT)
Dept: ORTHOPEDIC SURGERY | Age: 68
End: 2023-01-17
Payer: MEDICARE

## 2023-01-17 DIAGNOSIS — M19.012 ARTHRITIS OF LEFT SHOULDER REGION: ICD-10-CM

## 2023-01-17 DIAGNOSIS — M25.512 LEFT SHOULDER PAIN, UNSPECIFIED CHRONICITY: Primary | ICD-10-CM

## 2023-01-17 RX ORDER — LIDOCAINE HYDROCHLORIDE 10 MG/ML
9 INJECTION INFILTRATION; PERINEURAL ONCE
Status: COMPLETED | OUTPATIENT
Start: 2023-01-17 | End: 2023-01-17

## 2023-01-17 RX ORDER — TRIAMCINOLONE ACETONIDE 40 MG/ML
40 INJECTION, SUSPENSION INTRA-ARTICULAR; INTRAMUSCULAR ONCE
Status: COMPLETED | OUTPATIENT
Start: 2023-01-17 | End: 2023-01-17

## 2023-01-17 RX ORDER — TIZANIDINE 2 MG/1
TABLET ORAL
COMMUNITY
Start: 2022-12-27

## 2023-01-17 RX ORDER — CELECOXIB 200 MG/1
CAPSULE ORAL
COMMUNITY
Start: 2022-12-21

## 2023-01-17 RX ADMIN — LIDOCAINE HYDROCHLORIDE 9 ML: 10 INJECTION INFILTRATION; PERINEURAL at 16:00

## 2023-01-17 RX ADMIN — TRIAMCINOLONE ACETONIDE 40 MG: 40 INJECTION, SUSPENSION INTRA-ARTICULAR; INTRAMUSCULAR at 16:00

## 2023-01-17 NOTE — PATIENT INSTRUCTIONS

## 2023-01-17 NOTE — LETTER
Elizabeth Riley   1955   [id number]       1/17/2023       I hereby authorize and direct Edd Rose MD, Yen Haywood, and whomever he may designate as his associate to perform upon myself the following procedure:    Injection of: Kenalog, Supartz, Euflexxa, Orthovisc in the Right/Left ____________________. If any unforeseen condition arises in the course of the procedure, I further authorize him and his associated and/or assistant(s) to do whatever he/she deems advisable. The nature, purpose, benefits, risks, side effects, likelihood of achieving goals, and potential problems that might occur during recuperation, risks for not receiving the proposed care, treatment and services and alternatives of the procedure have been fully explained to me by my physician including, but not limited to:    Swelling, joint pain, skin pigment changes, worsening of condition, and failure to improve. I acknowledge that no guarantee or assurance has been made to me as to the results that may be obtained or the likelihood of success.                 _______________________________________     Signature of patient or authorized representative                [employer] [facility] fax: [phone]     Room 5

## 2023-01-17 NOTE — LETTER
1/18/2023    Patient: Astrid Hogue   YOB: 1955   Date of Visit: 1/17/2023     Yamilet Mcdonnell MD  [address] [address]  Via Fax: [phone]    Dear Yamilet Mcdonnell MD,      Thank you for referring Mr. Shira Middleton to [address] [place] [address] for evaluation. My notes for this consultation are attached. If you have questions, please do not hesitate to call me. I look forward to following your patient along with you.       Sincerely,    Rome Narayanan MD

## 2023-01-17 NOTE — PROGRESS NOTES
Name: Andrew Mcbride    : 1955     Service Dept: [address] [place] [address]    Chief Complaint   Patient presents with    Shoulder Pain        There were no vitals taken for this visit. No Known Allergies     Current Outpatient Medications   Medication Sig Dispense Refill    celecoxib (CELEBREX) 200 mg capsule TAKE 1 CAPSULE BY MOUTH TWICE DAILY WITH FOOD AS NEEDED FOR SHOULDER PAIN      tiZANidine (ZANAFLEX) 2 mg tablet TAKE 1 TABLET BY MOUTH THREE TIMES DAILY AS NEEDED FOR SPASMS OR PAIN      lidocaine 4 % patch 1 patch q 12 hours for pain  Indications: Pain 10 Patch 0    cyclobenzaprine (FLEXERIL) 5 mg tablet Take 1 Tablet by mouth two (2) times a day. 10 Tablet 0    ibuprofen (MOTRIN) 400 mg tablet Take 1 Tablet by mouth every six (6) hours as needed for Pain. 20 Tablet 0    polyethylene glycol (Miralax) 17 gram/dose powder Take 17 g by mouth two (2) times a day. 1 tablespoon with 8 oz of water daily 235 g 0    Vitamin D3 50 mcg (2,000 unit) tab Take 1 Tablet by mouth daily. loratadine (CLARITIN) 10 mg tablet Take 10 mg by mouth daily. methocarbamoL (ROBAXIN) 500 mg tablet       oxyCODONE-acetaminophen (PERCOCET) 5-325 mg per tablet take 1 tablet by mouth every 6 hours if needed      omeprazole (PRILOSEC) 40 mg capsule TAKE 1 TABLET BY MOUTH 30 MINUTES BEFORE MORNING MEAL ONCE DAILY      traZODone (DESYREL) 50 mg tablet take 1 tablet by mouth at bedtime if needed for sleep      tadalafiL (CIALIS) 20 mg tablet Take 1 Tablet by mouth as needed for Erectile Dysfunction. Take no more than 1 pill in 24 hours. 30 Tablet 2    HYDROcodone-acetaminophen (NORCO)  mg tablet Take 1 Tablet by mouth every six (6) hours as needed. gabapentin (NEURONTIN) 300 mg capsule Take 300 mg by mouth three (3) times daily.       tamsulosin (FLOMAX) 0.4 mg capsule take 1 capsule by mouth at bedtime      lubiPROStone (AMITIZA) 24 mcg capsule Take 24 mcg by mouth two (2) times daily (with meals). Linzess 145 mcg cap capsule take 1 capsule by mouth AT LEAST 30 MINUTES BEFORE THE FIRST MEAL OF THE DAY ON AN EMPTY STOMACH      mirabegron ER (Myrbetriq) 50 mg ER tablet Take 1 Tablet by mouth daily. 30 Tablet 3    cyclobenzaprine (FLEXERIL) 10 mg tablet Take 10 mg by mouth three (3) times daily as needed. ergocalciferol (ERGOCALCIFEROL) 1,250 mcg (50,000 unit) capsule Take 50,000 Units by mouth every seven (7) days. famotidine (PEPCID) 20 mg tablet famotidine 20 mg tablet      fluticasone propionate (FLONASE) 50 mcg/actuation nasal spray fluticasone propionate 50 mcg/actuation nasal spray,suspension      doxazosin (CARDURA) 4 mg tablet take 1/2 tablet once daily 90 Tab 0    metoprolol succinate (TOPROL-XL) 25 mg XL tablet metoprolol succinate ER 25 mg tablet,extended release 24 hr      aspirin delayed-release 81 mg tablet Take  by mouth daily. omeprazole (PRILOSEC) 20 mg capsule Take 20 mg by mouth daily. levothyroxine (SYNTHROID) 75 mcg tablet Take  by mouth Daily (before breakfast).        Current Facility-Administered Medications   Medication Dose Route Frequency Provider Last Rate Last Admin    [COMPLETED] lidocaine (XYLOCAINE) 10 mg/mL (1 %) injection 9 mL  9 mL Other ONCE Edd Bowie MD   9 mL at 01/17/23 1600    [COMPLETED] triamcinolone acetonide (KENALOG-40) 40 mg/mL injection 40 mg  40 mg Intra artICUlar ONCE Edd Bowie MD   40 mg at 01/17/23 1600      Patient Active Problem List   Diagnosis Code    Prostate cancer ([facility] [address] C61    Abdominal pain R10.9    Cervical spondylosis with myelopathy M47.12    Generalized pain R52    Hyperthyroidism E05.90    Pneumonia, organism unspecified(486) J18.9    [facility] A41.9      Family History   Problem Relation Age of Onset    Heart Disease Father     Cancer Maternal Grandmother         pancreatic    Cancer Maternal Grandfather         lung    Heart Disease Paternal Grandfather     Cancer Sister         colon Social History     Socioeconomic History    Marital status: SINGLE   Tobacco Use    Smoking status: Every Day     Packs/day: 0.50     Types: Cigarettes    Smokeless tobacco: Never   Vaping Use    Vaping Use: Never used   Substance and Sexual Activity    Alcohol use: Yes     Alcohol/week: 18.0 standard drinks     Types: 18 Cans of beer per week     Comment: occasionally    Drug use: Not Currently     Types: Marijuana      Past Surgical History:   Procedure Laterality Date    HX BACK SURGERY  2008    HX OTHER SURGICAL  10/2018    Neck    HX RADICAL PROSTATECTOMY  5/20/13    DVP      Past Medical History:   Diagnosis Date    Back pain     Hypothyroidism     Prostate cancer ([facility] [address]      pT2c madelaine 3 + 3 adenocarcinoma of the prostate s/p DVP 5/20/13         I have reviewed and agree with [address] [place] [address] Nw and ROS and intake form in chart and the record furthermore I have reviewed prior medical record(s) regarding this patients care during this appointment. Review of Systems:   Patient is a pleasant appearing individual, appropriately dressed, well hydrated, well nourished, who is alert, appropriately oriented for age, and in no acute distress with a normal gait and normal affect who does not appear to be in any significant pain. Physical Exam:  Left Shoulder - Grossly neurovascularly intact. Range of motion-Decreased actively and passively. No Point tenderness, Strength-weakness with abduction, positive crepitation, No skin lesion are identified, No instabilty is noted, Positive apprehension, No Swelling. Right Shoulder - Grossly neurovascularly intact, Full Range of motion, No point tenderness, No weakness, No skin lesions, No Instability, No apprehension, No swelling. Procedure Documentation:    I discussed in detail the risks, benefits and complications of an injection which included but are not limited to infection, skin reactions, hot swollen joint, and anaphylaxis with the patient.  The patient verbalized understanding and gave informed consent for the injection. The patient's left shoulder were prepped using sterile alcohol solution. A sterile needle was inserted into the left shoulder and the mixture of 9 mL Lidocaine 1%, 1 mL Kenalog 40 mg was injected under sterile technique. The needle was withdrawn and the puncture site sealed with a Band-Aid. Technique: Under sterile conditions a Agentek ultrasound unit with a variable frequency (7.0-14.0 MHz) linear transducer was used to localize the placement of needle into the left joint. Findings: Successful needle placement for shoulder injection. Final images were taken and saved for permanent record. The patient tolerated the injection well. The patient was instructed to call the office immediately if there is any pain, redness, warmth, fever, or chills. Encounter Diagnoses     ICD-10-CM ICD-9-CM   1. Left shoulder pain, unspecified chronicity  M25.512 719.41   2. Arthritis of left shoulder region  M19.012 716.91       HPI:  The patient is here with left shoulder pain. Pain is 7/10. X-rays are positive for early signs of rotator cuff arthropathy. X-rays are done at [facility].    Assessment/Plan:  1. Left shoulder pain. Plan will be for cortisone injection. See the patient back as needed. If the patient gets worse, he is to give me a call. He has not had any injection in the left shoulder. It is a new problem. As part of continued conservative pain management options the patient was advised to utilize Tylenol or OTC NSAIDS as long as it is not medically contraindicated. Return to Office: Follow-up and Dispositions    Return if symptoms worsen or fail to improve.         Administrations This Visit       lidocaine (XYLOCAINE) 10 mg/mL (1 %) injection 9 mL       Admin Date  01/17/2023 Action  Given Dose  9 mL Route  Other Administered By  Tobias Delcid LPN              triamcinolone acetonide (KENALOG-40) 40 mg/mL injection 40 mg       Admin Date  01/17/2023 Action  Given Dose  40 mg Route  Intra artICUlar Administered By  Nemo Engle LPN                   Scribed by Chris Aguilar LPN as dictated by [facility] MAX Ortiz MD.  Documentation, performed by, True and Accepted Edd Ortiz MD

## 2023-03-10 ENCOUNTER — HOSPITAL ENCOUNTER (OUTPATIENT)
Age: 68
End: 2023-03-10
Payer: MEDICARE

## 2023-03-10 ENCOUNTER — HOSPITAL ENCOUNTER (OUTPATIENT)
Age: 68
Discharge: HOME | End: 2023-03-10
Payer: MEDICARE

## 2023-03-10 DIAGNOSIS — G45.9 TIA (TRANSIENT ISCHEMIC ATTACK): ICD-10-CM

## 2023-03-10 PROCEDURE — 70450 CT HEAD/BRAIN W/O DYE: CPT

## 2023-03-10 PROCEDURE — 93880 EXTRACRANIAL BILAT STUDY: CPT

## 2023-03-11 LAB
VAS LEFT CCA DIST EDV: 15.5 CM/S
VAS LEFT CCA DIST PSV: 80.8 CM/S
VAS LEFT CCA MID EDV: 15.5 CM/S
VAS LEFT CCA MID PSV: 87.6 CM/S
VAS LEFT CCA PROX EDV: 17.5 CM/S
VAS LEFT CCA PROX PSV: 120 CM/S
VAS LEFT ECA PSV: 59.6 CM/S
VAS LEFT ICA DIST EDV: 23.4 CM/S
VAS LEFT ICA DIST PSV: 92.6 CM/S
VAS LEFT ICA MID EDV: 17.2 CM/S
VAS LEFT ICA MID PSV: 70.5 CM/S
VAS LEFT ICA PROX EDV: 15.7 CM/S
VAS LEFT ICA PROX PSV: 67.1 CM/S
VAS LEFT ICA/CCA PSV: 1.15
VAS LEFT SUBCLAVIAN MID PSV: 114 CM/S
VAS LEFT VERTEBRAL EDV: 8.23 CM/S
VAS LEFT VERTEBRAL PSV: 35.1 CM/S
VAS RIGHT CCA DIST EDV: 17.5 CM/S
VAS RIGHT CCA DIST PSV: 82 CM/S
VAS RIGHT CCA MID EDV: 15.4 CM/S
VAS RIGHT CCA MID PSV: 86.9 CM/S
VAS RIGHT CCA PROX EDV: 16.8 CM/S
VAS RIGHT CCA PROX PSV: 111 CM/S
VAS RIGHT ECA PSV: 91.8 CM/S
VAS RIGHT ICA DIST EDV: 23.8 CM/S
VAS RIGHT ICA DIST PSV: 81.3 CM/S
VAS RIGHT ICA MID EDV: 16.8 CM/S
VAS RIGHT ICA MID PSV: 63.8 CM/S
VAS RIGHT ICA PROX EDV: 15.4 CM/S
VAS RIGHT ICA PROX PSV: 50.4 CM/S
VAS RIGHT ICA/CCA PSV: 0.99
VAS RIGHT SUBCLAVIAN MID PSV: 96.3 CM/S
VAS RIGHT VERTEBRAL EDV: 12.1 CM/S
VAS RIGHT VERTEBRAL PSV: 43.4 CM/S

## 2023-03-17 ENCOUNTER — OFFICE VISIT (OUTPATIENT)
Age: 68
End: 2023-03-17
Payer: MEDICARE

## 2023-03-17 VITALS
BODY MASS INDEX: 21.84 KG/M2 | DIASTOLIC BLOOD PRESSURE: 61 MMHG | HEART RATE: 63 BPM | WEIGHT: 156 LBS | SYSTOLIC BLOOD PRESSURE: 117 MMHG | OXYGEN SATURATION: 96 % | HEIGHT: 71 IN

## 2023-03-17 DIAGNOSIS — Z71.6 TOBACCO ABUSE COUNSELING: ICD-10-CM

## 2023-03-17 DIAGNOSIS — I10 ESSENTIAL HYPERTENSION: ICD-10-CM

## 2023-03-17 DIAGNOSIS — R06.02 SOB (SHORTNESS OF BREATH): Primary | ICD-10-CM

## 2023-03-17 DIAGNOSIS — R94.31 ABNORMAL EKG: ICD-10-CM

## 2023-03-17 DIAGNOSIS — Z86.73 HISTORY OF CVA (CEREBROVASCULAR ACCIDENT): ICD-10-CM

## 2023-03-17 PROCEDURE — 99204 OFFICE O/P NEW MOD 45 MIN: CPT | Performed by: INTERNAL MEDICINE

## 2023-03-17 PROCEDURE — 93000 ELECTROCARDIOGRAM COMPLETE: CPT | Performed by: INTERNAL MEDICINE

## 2023-03-17 PROCEDURE — 3074F SYST BP LT 130 MM HG: CPT | Performed by: INTERNAL MEDICINE

## 2023-03-17 PROCEDURE — 3078F DIAST BP <80 MM HG: CPT | Performed by: INTERNAL MEDICINE

## 2023-03-17 PROCEDURE — 1123F ACP DISCUSS/DSCN MKR DOCD: CPT | Performed by: INTERNAL MEDICINE

## 2023-03-17 RX ORDER — TOLTERODINE 4 MG/1
4 CAPSULE, EXTENDED RELEASE ORAL DAILY
COMMUNITY

## 2023-03-17 RX ORDER — TIZANIDINE 2 MG/1
TABLET ORAL
COMMUNITY
Start: 2022-12-27

## 2023-03-17 RX ORDER — CELECOXIB 200 MG/1
CAPSULE ORAL
COMMUNITY
Start: 2023-03-14

## 2023-03-17 ASSESSMENT — PATIENT HEALTH QUESTIONNAIRE - PHQ9
SUM OF ALL RESPONSES TO PHQ QUESTIONS 1-9: 0
SUM OF ALL RESPONSES TO PHQ9 QUESTIONS 1 & 2: 0
1. LITTLE INTEREST OR PLEASURE IN DOING THINGS: 0
SUM OF ALL RESPONSES TO PHQ QUESTIONS 1-9: 0
SUM OF ALL RESPONSES TO PHQ QUESTIONS 1-9: 0
DEPRESSION UNABLE TO ASSESS: FUNCTIONAL CAPACITY MOTIVATION LIMITS ACCURACY
2. FEELING DOWN, DEPRESSED OR HOPELESS: 0
SUM OF ALL RESPONSES TO PHQ QUESTIONS 1-9: 0

## 2023-03-17 ASSESSMENT — ENCOUNTER SYMPTOMS
SHORTNESS OF BREATH: 1
GASTROINTESTINAL NEGATIVE: 1
EYES NEGATIVE: 1

## 2023-03-17 NOTE — PROGRESS NOTES
given    Medications Discontinued During This Encounter   Medication Reason    cyclobenzaprine (FLEXERIL) 5 MG tablet DUPLICATE    omeprazole (PRILOSEC) 20 MG delayed release capsule DUPLICATE    ergocalciferol (ERGOCALCIFEROL) 1.25 MG (73047 UT) capsule DUPLICATE    methocarbamol (ROBAXIN) 500 MG tablet Therapy completed    doxazosin (CARDURA) 4 MG tablet DISCONTINUED BY ANOTHER CLINICIAN    famotidine (PEPCID) 20 MG tablet DISCONTINUED BY ANOTHER CLINICIAN    HYDROcodone-acetaminophen (NORCO)  MG per tablet Therapy completed    ibuprofen (ADVIL;MOTRIN) 400 MG tablet Therapy completed    lidocaine 4 % external patch Therapy completed    lubiprostone (AMITIZA) 24 MCG capsule DISCONTINUED BY ANOTHER CLINICIAN    mirabegron (MYRBETRIQ) 50 MG TB24 Patient Choice    tadalafil (CIALIS) 20 MG tablet DISCONTINUED BY ANOTHER CLINICIAN       Follow-up and Dispositions    Return in about 3 months (around 6/17/2023), or if symptoms worsen or fail to improve, for post test/procedure. Return to ER if any significant CP not relieved by s/l NTG, severe SOB, severe palpitations, loss of consciousness    3/17/2023 dyspnea on exertion with abnormal EKG. Check echo and a nuclear stress test.  Recommended to stop smoking. Blood pressure is controlled. Check lipids.

## 2023-06-19 ENCOUNTER — OFFICE VISIT (OUTPATIENT)
Age: 68
End: 2023-06-19
Payer: MEDICARE

## 2023-06-19 VITALS
WEIGHT: 158 LBS | SYSTOLIC BLOOD PRESSURE: 122 MMHG | BODY MASS INDEX: 22.12 KG/M2 | OXYGEN SATURATION: 99 % | HEIGHT: 71 IN | DIASTOLIC BLOOD PRESSURE: 64 MMHG | HEART RATE: 53 BPM

## 2023-06-19 DIAGNOSIS — Z86.73 HISTORY OF CVA (CEREBROVASCULAR ACCIDENT): ICD-10-CM

## 2023-06-19 DIAGNOSIS — I10 ESSENTIAL HYPERTENSION: ICD-10-CM

## 2023-06-19 DIAGNOSIS — I10 ESSENTIAL HYPERTENSION: Primary | ICD-10-CM

## 2023-06-19 DIAGNOSIS — Z71.6 TOBACCO ABUSE COUNSELING: ICD-10-CM

## 2023-06-19 DIAGNOSIS — R42 DIZZINESS: ICD-10-CM

## 2023-06-19 PROCEDURE — 3074F SYST BP LT 130 MM HG: CPT | Performed by: INTERNAL MEDICINE

## 2023-06-19 PROCEDURE — 1123F ACP DISCUSS/DSCN MKR DOCD: CPT | Performed by: INTERNAL MEDICINE

## 2023-06-19 PROCEDURE — 99214 OFFICE O/P EST MOD 30 MIN: CPT | Performed by: INTERNAL MEDICINE

## 2023-06-19 PROCEDURE — 3078F DIAST BP <80 MM HG: CPT | Performed by: INTERNAL MEDICINE

## 2023-06-19 RX ORDER — PANTOPRAZOLE SODIUM 40 MG/1
TABLET, DELAYED RELEASE ORAL
COMMUNITY
Start: 2023-06-05

## 2023-06-19 RX ORDER — MELOXICAM 7.5 MG/1
7.5 TABLET ORAL DAILY
COMMUNITY
Start: 2023-05-30

## 2023-06-19 ASSESSMENT — PATIENT HEALTH QUESTIONNAIRE - PHQ9
1. LITTLE INTEREST OR PLEASURE IN DOING THINGS: 0
SUM OF ALL RESPONSES TO PHQ9 QUESTIONS 1 & 2: 0
SUM OF ALL RESPONSES TO PHQ QUESTIONS 1-9: 0
SUM OF ALL RESPONSES TO PHQ QUESTIONS 1-9: 0
DEPRESSION UNABLE TO ASSESS: FUNCTIONAL CAPACITY MOTIVATION LIMITS ACCURACY
SUM OF ALL RESPONSES TO PHQ QUESTIONS 1-9: 0
2. FEELING DOWN, DEPRESSED OR HOPELESS: 0
SUM OF ALL RESPONSES TO PHQ QUESTIONS 1-9: 0

## 2023-06-19 ASSESSMENT — ENCOUNTER SYMPTOMS
RESPIRATORY NEGATIVE: 1
EYES NEGATIVE: 1
GASTROINTESTINAL NEGATIVE: 1

## 2023-06-19 NOTE — PROGRESS NOTES
1. Have you been to the ER, urgent care clinic since your last visit? Hospitalized since your last visit? No      2. Where do you normally have your labs drawn? [place]    3. Do you need any refills today? No    4. Which local pharmacy do you use (enter pharmacy)? [facility]    5. Which mail order pharmacy do you use (enter pharmacy)? No     6. Are you here for surgical clearance and if so who will be doing your     procedure/surgery (care team)?    No
ventricular function post-stress is normal. Post-stress ejection fraction is 55%. Perfusion Conclusion: There is no evidence of transient ischemic dilation (TID). Stress Test: A pharmacological stress test was performed using lexiscan. Hemodynamics are adequate for diagnosis. Blood pressure demonstrated a normal response and heart rate demonstrated a normal response to stress. The patient's heart rate recovery was normal. The patient reported no symptoms during the stress test.    Low Risk Stress Test  Small size fixed defect septal of mild intensity from midwall to base may represent prior infarct versus soft tissue attenuation present also on polar maps and images  Ejection fraction 55% with TID of 1.02  SSS= 2, SRS = 5, SDS= 3    Signed by: Rick Crockett MD on 4/15/2023  1:03 PM    ASSESSMENT & PLAN    No results found for: CHOL  No results found for: TRIG  No results found for: HDL  No results found for: LDLCHOLESTEROL, LDLCALC  No results found for: LABVLDL, VLDL        3/17/2023 dyspnea on exertion with abnormal EKG. Check echo and a nuclear stress test.  Recommended to stop smoking. Blood pressure is controlled. Check lipids. Ilene Ozuna was seen today for follow-up. Diagnoses and all orders for this visit:    Essential hypertension  -     Hepatic Function Panel; Future  -     Lipid Panel; Future    Tobacco abuse counseling    History of CVA (cerebrovascular accident)  -     Hepatic Function Panel; Future  -     Lipid Panel; Future    Dizziness        Pertinent laboratory and test data reviewed and discussed with patient.   See patient instructions also for other medical advice given    Medications Discontinued During This Encounter   Medication Reason    omeprazole (PRILOSEC) 40 MG delayed release capsule Alternate therapy    oxyCODONE-acetaminophen (PERCOCET) 5-325 MG per tablet     tamsulosin (FLOMAX) 0.4 MG capsule     tiZANidine (ZANAFLEX) 2 MG tablet        Follow-up and Dispositions

## 2023-06-28 ENCOUNTER — ANESTHESIA EVENT (OUTPATIENT)
Facility: HOSPITAL | Age: 68
End: 2023-06-28
Payer: MEDICARE

## 2023-06-29 ENCOUNTER — ANESTHESIA (OUTPATIENT)
Facility: HOSPITAL | Age: 68
End: 2023-06-29
Payer: MEDICARE

## 2023-06-29 ENCOUNTER — HOSPITAL ENCOUNTER (OUTPATIENT)
Facility: HOSPITAL | Age: 68
Setting detail: OUTPATIENT SURGERY
Discharge: HOME | End: 2023-06-29
Attending: INTERNAL MEDICINE
Payer: MEDICARE

## 2023-06-29 VITALS
HEART RATE: 52 BPM | DIASTOLIC BLOOD PRESSURE: 72 MMHG | RESPIRATION RATE: 10 BRPM | TEMPERATURE: 97.7 F | OXYGEN SATURATION: 100 % | SYSTOLIC BLOOD PRESSURE: 157 MMHG

## 2023-06-29 LAB
AMPHET UR QL SCN: NEGATIVE
BARBITURATES UR QL SCN: NEGATIVE
BENZODIAZ UR QL: NEGATIVE
CANNABINOIDS UR QL SCN: NEGATIVE
COCAINE UR QL SCN: POSITIVE
Lab: ABNORMAL
METHADONE UR QL: NEGATIVE
OPIATES UR QL: NEGATIVE
PCP UR QL: NEGATIVE

## 2023-06-29 PROCEDURE — 2709999900 HC NON-CHARGEABLE SUPPLY: Performed by: INTERNAL MEDICINE

## 2023-06-29 PROCEDURE — 80307 DRUG TEST PRSMV CHEM ANLYZR: CPT

## 2023-06-29 RX ORDER — LIDOCAINE HYDROCHLORIDE 10 MG/ML
1 INJECTION, SOLUTION EPIDURAL; INFILTRATION; INTRACAUDAL; PERINEURAL
Status: DISCONTINUED | OUTPATIENT
Start: 2023-06-29 | End: 2023-06-29 | Stop reason: HOSPADM

## 2023-06-29 RX ORDER — SODIUM CHLORIDE, SODIUM LACTATE, POTASSIUM CHLORIDE, CALCIUM CHLORIDE 600; 310; 30; 20 MG/100ML; MG/100ML; MG/100ML; MG/100ML
INJECTION, SOLUTION INTRAVENOUS CONTINUOUS
Status: DISCONTINUED | OUTPATIENT
Start: 2023-06-29 | End: 2023-06-30 | Stop reason: HOSPADM

## 2023-06-29 ASSESSMENT — LIFESTYLE VARIABLES: SMOKING_STATUS: 1

## 2023-06-29 ASSESSMENT — ENCOUNTER SYMPTOMS: SHORTNESS OF BREATH: 1

## 2023-07-04 NOTE — PROCEDURES
Due to use of cocaine the procedure  is cancelled by anaesthesia - will reschedule.     Caitlin Boggs MD

## 2023-07-17 ENCOUNTER — TELEPHONE (OUTPATIENT)
Age: 68
End: 2023-07-17

## 2023-07-17 NOTE — TELEPHONE ENCOUNTER
Spoke to patient per Dr. Dari Mora regarding clearance. Prior office notes and echo and stress test reviewed from Dr. Umer Borjas note. Patient should be okay to proceed with the surgery as planned medium cardiac risk. He voices understanding and acceptance of this advice and will call back if any further questions or concerns.

## 2023-07-17 NOTE — TELEPHONE ENCOUNTER
Patient is schedule to have hip surgery on August 9, 2023. Please advise if patient can be cleared from last office visit.

## 2023-07-21 ENCOUNTER — HOSPITAL ENCOUNTER (OUTPATIENT)
Facility: HOSPITAL | Age: 68
Discharge: HOME | End: 2023-07-24

## 2023-07-21 ENCOUNTER — HOSPITAL ENCOUNTER (OUTPATIENT)
Facility: HOSPITAL | Age: 68
End: 2023-07-21
Payer: MEDICARE

## 2023-07-21 DIAGNOSIS — M16.12 PRIMARY OSTEOARTHRITIS OF LEFT HIP: ICD-10-CM

## 2023-07-21 LAB
EKG ATRIAL RATE: 50 BPM
EKG DIAGNOSIS: NORMAL
EKG P AXIS: 76 DEGREES
EKG P-R INTERVAL: 156 MS
EKG Q-T INTERVAL: 482 MS
EKG QRS DURATION: 86 MS
EKG QTC CALCULATION (BAZETT): 439 MS
EKG R AXIS: 68 DEGREES
EKG T AXIS: 88 DEGREES
EKG VENTRICULAR RATE: 50 BPM
LABCORP SPECIMEN COLLECTION: NORMAL

## 2023-07-21 PROCEDURE — 93005 ELECTROCARDIOGRAM TRACING: CPT

## 2023-07-21 RX ORDER — TRAZODONE HYDROCHLORIDE 50 MG/1
50 TABLET ORAL
COMMUNITY

## 2023-07-21 RX ORDER — ASPIRIN 81 MG/1
81 TABLET ORAL DAILY
COMMUNITY

## 2023-07-21 RX ORDER — CELECOXIB 200 MG/1
200 CAPSULE ORAL 2 TIMES DAILY
COMMUNITY
End: 2023-07-24

## 2023-07-21 RX ORDER — CYCLOBENZAPRINE HCL 5 MG
5 TABLET ORAL 3 TIMES DAILY PRN
COMMUNITY
End: 2023-07-21

## 2023-07-21 RX ORDER — TIZANIDINE 2 MG/1
TABLET ORAL AS NEEDED
COMMUNITY
Start: 2022-12-27

## 2023-07-21 RX ORDER — OMEPRAZOLE 20 MG/1
20 CAPSULE, DELAYED RELEASE ORAL DAILY
COMMUNITY
End: 2023-08-25

## 2023-07-21 RX ORDER — CYCLOBENZAPRINE HCL 10 MG
10 TABLET ORAL 3 TIMES DAILY PRN
COMMUNITY

## 2023-07-21 RX ORDER — MELOXICAM 7.5 MG/1
7.5 TABLET ORAL DAILY
COMMUNITY

## 2023-08-01 PROBLEM — M16.12 PRIMARY OSTEOARTHRITIS OF LEFT HIP: Chronic | Status: ACTIVE | Noted: 2023-08-01

## 2023-08-01 NOTE — H&P
History and Physical        Patient: Obi Christie               Sex: male          DOA: (Not on file)         YOB: 1955      Age:  80 y.o.        LOS:  LOS: 0 days        HPI:     Mikayla Reich is a 71-year-old, right-handed,  male seen today as a new patient for evaluation and treatment of a left severe coxarthrosis status post T12 -S1 fusion. The patient is in for evaluation. He has been having progressive left hip pain that radiates down to the knee and it has gotten worse with time with loss of mobility. He is with his cousin today. He is here today for consideration of hip replacement. He was set up with another doctor here in the next week or two and wants to switch to my practice. An AP view of the pelvis was obtained and interpreted in the office today and reveals a left greater than right severe to moderate coxarthrosis; otherwise, no periosteal reaction, no medullary lesions, no osteopenia, well-aligned joint spaces, no chondrolysis, and no fractures.   Past Medical History:   Diagnosis Date    Acid reflux     Back pain     Cerebral artery occlusion with cerebral infarction [facility])     TIA    Cervical spondylosis     Liz Ledbetter    DVT (deep venous thrombosis) ([facility])     left leg, unkown year    Erectile dysfunction     Shena Lebo    Hypertension     Hypothyroidism     Incontinence 12/10/2020    fecal and urinary, Shena Lebo    Muscle atrophy     OA (osteoarthritis)     OAB (overactive bladder)     Prostate cancer ([address])     pT2c concetta 3 + 3 adenocarcinoma of the prostate s/p DVP 5/20/13, Shena Lebo    Sepsis [facility])     unknown year    Shoulder pain, left     Percy Goss    TIA (transient ischemic attack) 2010    patient denies    Urinary frequency     Shena Lebo       Past Surgical History:   Procedure Laterality Date    BACK SURGERY  2008    CERVICAL FUSION  08/22/2022    DISCECTOMY, SPINE, CERVICAL, 1 LEVEL, ANTERIOR APPROACH, WITH FUSION

## 2023-08-08 ENCOUNTER — ANESTHESIA EVENT (OUTPATIENT)
Facility: HOSPITAL | Age: 68
End: 2023-08-08
Payer: MEDICARE

## 2023-08-09 ENCOUNTER — HOSPITAL ENCOUNTER (OUTPATIENT)
Facility: HOSPITAL | Age: 68
Discharge: HOME | End: 2023-08-09
Attending: ORTHOPAEDIC SURGERY | Admitting: ORTHOPAEDIC SURGERY
Payer: MEDICARE

## 2023-08-09 ENCOUNTER — APPOINTMENT (OUTPATIENT)
Facility: HOSPITAL | Age: 68
End: 2023-08-09
Attending: ORTHOPAEDIC SURGERY
Payer: MEDICARE

## 2023-08-09 ENCOUNTER — ANESTHESIA (OUTPATIENT)
Facility: HOSPITAL | Age: 68
End: 2023-08-09
Payer: MEDICARE

## 2023-08-09 VITALS
RESPIRATION RATE: 16 BRPM | WEIGHT: 148.5 LBS | SYSTOLIC BLOOD PRESSURE: 137 MMHG | HEIGHT: 69 IN | DIASTOLIC BLOOD PRESSURE: 69 MMHG | OXYGEN SATURATION: 99 % | BODY MASS INDEX: 22 KG/M2 | HEART RATE: 51 BPM | TEMPERATURE: 97.5 F

## 2023-08-09 PROBLEM — M16.12 OSTEOARTHRITIS OF LEFT HIP, UNSPECIFIED OSTEOARTHRITIS TYPE: Status: ACTIVE | Noted: 2023-08-09

## 2023-08-09 LAB
ABO + RH BLD: NORMAL
AMPHET UR QL SCN: NEGATIVE
BARBITURATES UR QL SCN: NEGATIVE
BENZODIAZ UR QL: NEGATIVE
BLOOD GROUP ANTIBODIES SERPL: NORMAL
CANNABINOIDS UR QL SCN: NEGATIVE
COCAINE UR QL SCN: POSITIVE
Lab: ABNORMAL
METHADONE UR QL: NEGATIVE
OPIATES UR QL: NEGATIVE
PCP UR QL: NEGATIVE
SPECIMEN EXP DATE BLD: NORMAL

## 2023-08-09 PROCEDURE — 80307 DRUG TEST PRSMV CHEM ANLYZR: CPT

## 2023-08-09 PROCEDURE — 86900 BLOOD TYPING SEROLOGIC ABO: CPT

## 2023-08-09 PROCEDURE — 86901 BLOOD TYPING SEROLOGIC RH(D): CPT

## 2023-08-09 PROCEDURE — 86850 RBC ANTIBODY SCREEN: CPT

## 2023-08-09 RX ORDER — SODIUM CHLORIDE, SODIUM LACTATE, POTASSIUM CHLORIDE, CALCIUM CHLORIDE 600; 310; 30; 20 MG/100ML; MG/100ML; MG/100ML; MG/100ML
INJECTION, SOLUTION INTRAVENOUS CONTINUOUS
Status: DISCONTINUED | OUTPATIENT
Start: 2023-08-09 | End: 2023-08-16 | Stop reason: HOSPADM

## 2023-08-09 RX ORDER — SODIUM CHLORIDE, SODIUM LACTATE, POTASSIUM CHLORIDE, CALCIUM CHLORIDE 600; 310; 30; 20 MG/100ML; MG/100ML; MG/100ML; MG/100ML
INJECTION, SOLUTION INTRAVENOUS CONTINUOUS
Status: CANCELLED | OUTPATIENT
Start: 2023-08-09

## 2023-08-09 RX ORDER — OXYCODONE HYDROCHLORIDE 5 MG/1
5 TABLET ORAL
Status: CANCELLED | OUTPATIENT
Start: 2023-08-09 | End: 2023-08-10

## 2023-08-09 RX ORDER — SODIUM CHLORIDE 0.9 % (FLUSH) 0.9 %
5-40 SYRINGE (ML) INJECTION EVERY 12 HOURS SCHEDULED
Status: CANCELLED | OUTPATIENT
Start: 2023-08-09

## 2023-08-09 RX ORDER — ACETAMINOPHEN 500 MG
1000 TABLET ORAL ONCE
Status: DISCONTINUED | OUTPATIENT
Start: 2023-08-09 | End: 2023-08-16 | Stop reason: HOSPADM

## 2023-08-09 RX ORDER — HYDROMORPHONE HYDROCHLORIDE 1 MG/ML
0.25 INJECTION, SOLUTION INTRAMUSCULAR; INTRAVENOUS; SUBCUTANEOUS EVERY 5 MIN PRN
Status: CANCELLED | OUTPATIENT
Start: 2023-08-09

## 2023-08-09 RX ORDER — SODIUM CHLORIDE 0.9 % (FLUSH) 0.9 %
5-40 SYRINGE (ML) INJECTION PRN
Status: CANCELLED | OUTPATIENT
Start: 2023-08-09

## 2023-08-09 RX ORDER — DROPERIDOL 2.5 MG/ML
0.62 INJECTION, SOLUTION INTRAMUSCULAR; INTRAVENOUS
Status: CANCELLED | OUTPATIENT
Start: 2023-08-09 | End: 2023-08-10

## 2023-08-09 RX ORDER — CELECOXIB 100 MG/1
200 CAPSULE ORAL ONCE
Status: DISCONTINUED | OUTPATIENT
Start: 2023-08-09 | End: 2023-08-16 | Stop reason: HOSPADM

## 2023-08-09 RX ORDER — SODIUM CHLORIDE 9 MG/ML
INJECTION, SOLUTION INTRAVENOUS PRN
Status: CANCELLED | OUTPATIENT
Start: 2023-08-09

## 2023-08-09 RX ORDER — DIPHENHYDRAMINE HYDROCHLORIDE 50 MG/ML
12.5 INJECTION INTRAMUSCULAR; INTRAVENOUS
Status: CANCELLED | OUTPATIENT
Start: 2023-08-09 | End: 2023-08-10

## 2023-08-09 RX ORDER — TRANEXAMIC ACID 650 MG/1
1950 TABLET ORAL
Status: DISCONTINUED | OUTPATIENT
Start: 2023-08-09 | End: 2023-08-16 | Stop reason: HOSPADM

## 2023-08-09 RX ORDER — LABETALOL HYDROCHLORIDE 5 MG/ML
10 INJECTION, SOLUTION INTRAVENOUS
Status: CANCELLED | OUTPATIENT
Start: 2023-08-09

## 2023-08-09 RX ORDER — PANTOPRAZOLE SODIUM 40 MG/1
40 TABLET, DELAYED RELEASE ORAL ONCE
Status: DISCONTINUED | OUTPATIENT
Start: 2023-08-09 | End: 2023-08-16 | Stop reason: HOSPADM

## 2023-08-09 RX ORDER — SODIUM CHLORIDE 9 MG/ML
INJECTION, SOLUTION INTRAVENOUS PRN
Status: DISCONTINUED | OUTPATIENT
Start: 2023-08-09 | End: 2023-08-16 | Stop reason: HOSPADM

## 2023-08-09 RX ORDER — DEXAMETHASONE SODIUM PHOSPHATE 4 MG/ML
8 INJECTION, SOLUTION INTRA-ARTICULAR; INTRALESIONAL; INTRAMUSCULAR; INTRAVENOUS; SOFT TISSUE ONCE
Status: DISCONTINUED | OUTPATIENT
Start: 2023-08-09 | End: 2023-08-16 | Stop reason: HOSPADM

## 2023-08-09 RX ORDER — SODIUM CHLORIDE 0.9 % (FLUSH) 0.9 %
5-40 SYRINGE (ML) INJECTION PRN
Status: DISCONTINUED | OUTPATIENT
Start: 2023-08-09 | End: 2023-08-16 | Stop reason: HOSPADM

## 2023-08-09 RX ORDER — FENTANYL CITRATE 50 UG/ML
25 INJECTION, SOLUTION INTRAMUSCULAR; INTRAVENOUS EVERY 5 MIN PRN
Status: CANCELLED | OUTPATIENT
Start: 2023-08-09

## 2023-08-09 RX ORDER — SODIUM CHLORIDE 0.9 % (FLUSH) 0.9 %
5-40 SYRINGE (ML) INJECTION EVERY 12 HOURS SCHEDULED
Status: DISCONTINUED | OUTPATIENT
Start: 2023-08-09 | End: 2023-08-16 | Stop reason: HOSPADM

## 2023-08-09 RX ORDER — ONDANSETRON 2 MG/ML
4 INJECTION INTRAMUSCULAR; INTRAVENOUS
Status: CANCELLED | OUTPATIENT
Start: 2023-08-09 | End: 2023-08-10

## 2023-08-09 ASSESSMENT — PAIN DESCRIPTION - DESCRIPTORS: DESCRIPTORS: DULL;SHARP;ACHING

## 2023-08-09 ASSESSMENT — PAIN - FUNCTIONAL ASSESSMENT: PAIN_FUNCTIONAL_ASSESSMENT: 0-10

## 2023-08-09 ASSESSMENT — ENCOUNTER SYMPTOMS: SHORTNESS OF BREATH: 0

## 2023-08-09 ASSESSMENT — LIFESTYLE VARIABLES: SMOKING_STATUS: 1

## 2023-08-09 NOTE — INTERVAL H&P NOTE
Update History & Physical    The patient's History and Physical of August 1, the surgery was reviewed with the patient and I examined the patient. There was no change. The surgical site was confirmed by the patient and me. Plan: The risks, benefits, expected outcome, and alternative to the recommended procedure have been discussed with the patient. Patient understands and wants to proceed with the procedure.      Electronically signed by Jamia Goldstein MD on 8/9/2023 at 9:54 AM

## 2023-08-09 NOTE — PERIOP NOTE
Patient is canceled today due to positive urine drug screen. Patient is aware and verbalized understanding and has been informed to contact Dr. Mello Alcantar office to reschedule, No further questions at this time.

## 2023-08-09 NOTE — PERIOP NOTE
Patient aware that urine sample is being tested this morning. Aware surgery will be canceled if positive results. Verbalized understanding and states last drug use was a month ago.

## 2023-08-09 NOTE — PERIOP NOTE
Reviewed PTA medication list with patient/caregiver and patient/caregiver denies any additional medications. Patient admits to having a responsible adult care for them at home for at least 24 hours after surgery. Patient encouraged to use gown warming system and informed that using said warming gown to regulate body temperature prior to a procedure has been shown to help reduce the risks of blood clots and infection. Patient's pharmacy of choice verified and documented in PTA medication section. Dual skin assessment & fall risk band verification completed with Lonn Crigler RN.

## 2023-08-09 NOTE — ANESTHESIA PRE PROCEDURE
dentures  Comment: Poor lower dentition    Pulmonary:   (+) current smoker (1/2 ppd)    (-) shortness of breath                           Cardiovascular:Negative CV ROS  Exercise tolerance: good (>4 METS),       (-) hypertension                Neuro/Psych:   (+) TIA (10 + years ago),    (-) neuromuscular disease and CVA           GI/Hepatic/Renal:   (+) GERD:,           Endo/Other:    (+) hypothyroidism: arthritis:., .    (-) hyperthyroidism               Abdominal:             Vascular:   + DVT, . Other Findings:           Anesthesia Plan      general     ASA 2       Induction: intravenous. MIPS: Postoperative opioids intended. Anesthetic plan and risks discussed with patient. Plan discussed with CRNA. Awaitind urine drug screen.   Extensive lumbar scar after fusion - patient prefers Marty De La Torre MD   8/9/2023

## 2023-10-29 ENCOUNTER — HOSPITAL ENCOUNTER (EMERGENCY)
Age: 68
Discharge: HOME | End: 2023-10-29
Attending: EMERGENCY MEDICINE
Payer: MEDICARE

## 2023-10-29 ENCOUNTER — APPOINTMENT (OUTPATIENT)
Age: 68
End: 2023-10-29
Payer: MEDICARE

## 2023-10-29 VITALS
HEART RATE: 61 BPM | TEMPERATURE: 97.8 F | DIASTOLIC BLOOD PRESSURE: 74 MMHG | RESPIRATION RATE: 17 BRPM | SYSTOLIC BLOOD PRESSURE: 140 MMHG | BODY MASS INDEX: 21.47 KG/M2 | WEIGHT: 150 LBS | HEIGHT: 70 IN | OXYGEN SATURATION: 98 %

## 2023-10-29 DIAGNOSIS — K59.03 DRUG-INDUCED CONSTIPATION: Primary | ICD-10-CM

## 2023-10-29 PROCEDURE — 6370000000 HC RX 637 (ALT 250 FOR IP): Performed by: EMERGENCY MEDICINE

## 2023-10-29 PROCEDURE — 74018 RADEX ABDOMEN 1 VIEW: CPT

## 2023-10-29 PROCEDURE — 99283 EMERGENCY DEPT VISIT LOW MDM: CPT

## 2023-10-29 RX ORDER — SENNA AND DOCUSATE SODIUM 50; 8.6 MG/1; MG/1
1 TABLET, FILM COATED ORAL DAILY
Qty: 12 TABLET | Refills: 0 | Status: SHIPPED | OUTPATIENT
Start: 2023-10-29

## 2023-10-29 RX ORDER — BISACODYL 10 MG
10 SUPPOSITORY, RECTAL RECTAL
Status: COMPLETED | OUTPATIENT
Start: 2023-10-29 | End: 2023-10-29

## 2023-10-29 RX ADMIN — BISACODYL 10 MG: 10 SUPPOSITORY RECTAL at 07:57

## 2023-10-29 ASSESSMENT — PAIN - FUNCTIONAL ASSESSMENT: PAIN_FUNCTIONAL_ASSESSMENT: NONE - DENIES PAIN

## 2023-10-29 NOTE — ED TRIAGE NOTES
Pt ambulatory to room with cane assist, states has not had BM in 3 days. States that he has had abdominal cramping at home but denies abd pain ATT.

## 2023-10-29 NOTE — ED PROVIDER NOTES
[facility] EMERGENCY DEPT  EMERGENCY DEPARTMENT ENCOUNTER       Pt Name: Norma Krueger  MRN: [id number]  [address] 1955  Date of evaluation: 10/29/2023  Provider: Bhavya Quintanilla MD   PCP: Devin Silverman MD  Note Started: 7:41 AM 10/29/23     CHIEF COMPLAINT       Chief Complaint   Patient presents with    Constipation        HISTORY OF PRESENT ILLNESS: 1 or more elements      History From: Patient  History limited by: Nothing     Norma Krueger is a 80 y.o. male who presents to ED complaining of constipation and abdominal bloating since about a week ago. Last bowel movement was 6 days ago. He denies pain. He has no nausea or vomiting. No fever or chills. Reports he has been taking MiraLAX daily with no relief. Last dose was a day ago. Nursing Notes were all reviewed and agreed with or any disagreements were addressed in the HPI. REVIEW OF SYSTEMS      Review of Systems     Positives and Pertinent negatives as per HPI.     PAST HISTORY     Past Medical History:  Past Medical History:   Diagnosis Date    Acid reflux     Back pain     Cervical spondylosis     Gurdeep Gilliland    Constipation     using linzess    DVT (deep venous thrombosis) ([facility])     left leg, unkown year    Erectile dysfunction     Bee Diehl    Hypertension     Hypothyroidism     Incontinence 12/10/2020    fecal and urinary, Bee Diehl    Muscle atrophy     OA (osteoarthritis)     OAB (overactive bladder)     Osteoarthritis     left hip, Patsie Horn    Prostate cancer [facility])     pT2c concetta 3 + 3 adenocarcinoma of the prostate s/p DVP 5/20/13, Bee Nguchard    Sepsis [facility])     unknown year    Shoulder pain, left     Percy Goss    TIA (transient ischemic attack) 2010    Urinary frequency     Bee Diehl    Wears glasses     Wears hearing aid in both ears        Past Surgical History:  Past Surgical History:   Procedure Laterality Date    BACK SURGERY  2008    CERVICAL FUSION  08/22/2022    DISCECTOMY, SPINE,

## 2023-10-29 NOTE — ED NOTES
Tried miralax at home without relief, last dose last night.      Ely Pulliam, RN  10/29/23 2000 Shoaib Lai [facility]       Ely Pulliam, JOAQUÍN  10/29/23 1787

## 2024-01-17 DIAGNOSIS — I10 ESSENTIAL HYPERTENSION: ICD-10-CM

## 2024-05-22 ENCOUNTER — OFFICE VISIT (OUTPATIENT)
Age: 69
End: 2024-05-22
Payer: MEDICARE

## 2024-05-22 VITALS
SYSTOLIC BLOOD PRESSURE: 129 MMHG | DIASTOLIC BLOOD PRESSURE: 59 MMHG | OXYGEN SATURATION: 97 % | WEIGHT: 158 LBS | HEART RATE: 50 BPM | BODY MASS INDEX: 23.33 KG/M2

## 2024-05-22 DIAGNOSIS — Z86.73 HISTORY OF CVA (CEREBROVASCULAR ACCIDENT): ICD-10-CM

## 2024-05-22 DIAGNOSIS — I10 ESSENTIAL HYPERTENSION: Primary | ICD-10-CM

## 2024-05-22 DIAGNOSIS — Z01.810 PREOP CARDIOVASCULAR EXAM: ICD-10-CM

## 2024-05-22 DIAGNOSIS — Z71.6 TOBACCO ABUSE COUNSELING: ICD-10-CM

## 2024-05-22 DIAGNOSIS — I35.1 NONRHEUMATIC AORTIC VALVE INSUFFICIENCY: ICD-10-CM

## 2024-05-22 PROCEDURE — 3078F DIAST BP <80 MM HG: CPT | Performed by: INTERNAL MEDICINE

## 2024-05-22 PROCEDURE — 99213 OFFICE O/P EST LOW 20 MIN: CPT | Performed by: INTERNAL MEDICINE

## 2024-05-22 PROCEDURE — 3074F SYST BP LT 130 MM HG: CPT | Performed by: INTERNAL MEDICINE

## 2024-05-22 PROCEDURE — 1123F ACP DISCUSS/DSCN MKR DOCD: CPT | Performed by: INTERNAL MEDICINE

## 2024-05-22 RX ORDER — LEVOTHYROXINE SODIUM 0.07 MG/1
75 TABLET ORAL DAILY
COMMUNITY
Start: 2024-04-01

## 2024-05-22 ASSESSMENT — PATIENT HEALTH QUESTIONNAIRE - PHQ9
SUM OF ALL RESPONSES TO PHQ QUESTIONS 1-9: 0
DEPRESSION UNABLE TO ASSESS: FUNCTIONAL CAPACITY MOTIVATION LIMITS ACCURACY
2. FEELING DOWN, DEPRESSED OR HOPELESS: NOT AT ALL
1. LITTLE INTEREST OR PLEASURE IN DOING THINGS: NOT AT ALL
SUM OF ALL RESPONSES TO PHQ9 QUESTIONS 1 & 2: 0
SUM OF ALL RESPONSES TO PHQ QUESTIONS 1-9: 0

## 2024-05-22 ASSESSMENT — ENCOUNTER SYMPTOMS
GASTROINTESTINAL NEGATIVE: 1
EYES NEGATIVE: 1
RESPIRATORY NEGATIVE: 1

## 2024-05-22 NOTE — PROGRESS NOTES
Angel eBckham is a 68 y.o. year old male.    Follow-up of hypertension, tobacco abuse, aortic regurgitation  History of stroke    3/17/2023 seen as a new patient with referral from PCP but I do not have the exact reason.  Patient complains of shortness of breath.  It happens intermittently when he tries to  something heavy.  There is history of intermittent left lower leg edema.  No significant chest pain palpitations dizziness or loss of consciousness.  6/19/2023 no significant shortness of breath but tries not to lift anything heavy anymore.  No significant chest pain palpitations.  Occasional dizziness while standing or walking to mailbox.  No falls or loss of consciousness. Occasional edema that improves with leg elevation.  12/20/2023 routine follow-up as well as clearance for hip replacement.  Denies any significant chest pain shortness of breath.  Occasional edema both legs which she thinks are related to her socks.  Denies dizziness or palpitations.  5/22/2024 no significant chest pain dyspnea or palpitations.  Occasional edema still noted.  Occasional dizziness if he stands up too fast.  Hip surgery has not been done so far for which she was cleared in the past.          Review of Systems   Constitutional: Negative.    HENT: Negative.     Eyes: Negative.    Respiratory: Negative.     Cardiovascular:  Positive for leg swelling.   Gastrointestinal: Negative.    Endocrine: Negative.    Genitourinary: Negative.    Musculoskeletal: Negative.    Neurological:  Positive for dizziness.   Psychiatric/Behavioral: Negative.     All other systems reviewed and are negative.        Physical Exam  Vitals and nursing note reviewed.   Constitutional:       Appearance: Normal appearance.   HENT:      Head: Normocephalic and atraumatic.      Nose: Nose normal.   Eyes:      Conjunctiva/sclera: Conjunctivae normal.   Cardiovascular:      Rate and Rhythm: Normal rate and regular rhythm.      Pulses: Normal pulses.

## 2024-05-22 NOTE — PROGRESS NOTES
1. Have you been to the ER, urgent care clinic since your last visit?  Hospitalized since your last visit?     No      2.  Where do you normally have your labs drawn?   [facility]    3. Do you need any refills today?   No    4. Which local pharmacy do you use (enter pharmacy)?   [facility]    5. Which mail order pharmacy do you use (enter pharmacy)?   No     6. Are you here for surgical clearance and if so who will be doing your     procedure/surgery (care team)?   No

## 2024-05-30 ENCOUNTER — TELEPHONE (OUTPATIENT)
Age: 69
End: 2024-05-30

## 2025-03-13 ENCOUNTER — OFFICE VISIT (OUTPATIENT)
Age: 70
End: 2025-03-13

## 2025-03-13 DIAGNOSIS — M79.642 BILATERAL HAND PAIN: Primary | ICD-10-CM

## 2025-03-13 DIAGNOSIS — M18.11 PRIMARY OSTEOARTHRITIS OF FIRST CARPOMETACARPAL JOINT OF RIGHT HAND: ICD-10-CM

## 2025-03-13 DIAGNOSIS — M79.641 BILATERAL HAND PAIN: Primary | ICD-10-CM

## 2025-03-13 DIAGNOSIS — M25.442 EFFUSION OF JOINT OF LEFT HAND: ICD-10-CM

## 2025-03-13 DIAGNOSIS — M18.12 PRIMARY OSTEOARTHRITIS OF FIRST CARPOMETACARPAL JOINT OF LEFT HAND: ICD-10-CM

## 2025-03-13 RX ORDER — TRIAMCINOLONE ACETONIDE 40 MG/ML
40 INJECTION, SUSPENSION INTRA-ARTICULAR; INTRAMUSCULAR ONCE
Status: COMPLETED | OUTPATIENT
Start: 2025-03-13 | End: 2025-03-13

## 2025-03-13 RX ORDER — LIDOCAINE HYDROCHLORIDE 10 MG/ML
1 INJECTION, SOLUTION INFILTRATION; PERINEURAL ONCE
Status: COMPLETED | OUTPATIENT
Start: 2025-03-13 | End: 2025-03-13

## 2025-03-13 RX ADMIN — TRIAMCINOLONE ACETONIDE 40 MG: 40 INJECTION, SUSPENSION INTRA-ARTICULAR; INTRAMUSCULAR at 12:23

## 2025-03-13 RX ADMIN — LIDOCAINE HYDROCHLORIDE 1 ML: 10 INJECTION, SOLUTION INFILTRATION; PERINEURAL at 12:23

## 2025-03-13 RX ADMIN — TRIAMCINOLONE ACETONIDE 40 MG: 40 INJECTION, SUSPENSION INTRA-ARTICULAR; INTRAMUSCULAR at 12:24

## 2025-03-13 NOTE — PROGRESS NOTES
Name: Angel Beckham    : 1955     [facility]  [address], [address]  [facility] [address]  Dept: [phone]  Dept Fax: [phone]     Chief Complaint   Patient presents with    Hand Pain     bilateral        There were no vitals taken for this visit.     No Known Allergies     Current Outpatient Medications   Medication Sig Dispense Refill    tadalafil (CIALIS) 20 MG tablet Take 1 tablet by mouth daily as needed for Erectile Dysfunction 30 tablet 1    tolterodine (DETROL LA) 4 MG extended release capsule Take 1 capsule by mouth daily 90 capsule 1    tadalafil (CIALIS) 5 MG tablet Take 1 tablet by mouth daily 90 tablet 1    levothyroxine (SYNTHROID) 75 MCG tablet Take 1 tablet by mouth Daily      metoprolol tartrate (LOPRESSOR) 25 MG tablet Take 1 tablet by mouth 2 times daily      DULoxetine (CYMBALTA) 30 MG extended release capsule Take 1 capsule by mouth daily      oxyCODONE-acetaminophen (PERCOCET) 5-325 MG per tablet Take 1 tablet by mouth every 6 hours as needed.      atorvastatin (LIPITOR) 20 MG tablet Take 1 tablet by mouth daily 90 tablet 3    sennosides-docusate sodium (SENOKOT-S) 8.6-50 MG tablet Take 1 tablet by mouth daily 12 tablet 0    omeprazole (PRILOSEC) 40 MG delayed release capsule TAKE 1 CAPSULE BY MOUTH EVERY DAY 30 MINUTES BEFORE BREAKFAST      celecoxib (CELEBREX) 200 MG capsule Take 1 capsule by mouth 2 times daily      gabapentin (NEURONTIN) 300 MG capsule Take 1 capsule by mouth 3 times daily.      Multiple Vitamins-Minerals (ONE-A-DAY MENS 50+ PO) Take by mouth      traZODone (DESYREL) 50 MG tablet Take 1 tablet by mouth nightly Indications: sleep aid      fluticasone (FLONASE) 50 MCG/ACT nasal spray as needed      linaclotide (LINZESS) 145 MCG capsule Daily       Current Facility-Administered Medications   Medication Dose Route Frequency Provider Last Rate Last Admin    lidocaine 1

## 2025-03-13 NOTE — PATIENT INSTRUCTIONS
Hand Pain: Care Instructions  Overview     Common causes of hand pain are overuse and injuries, such as might happen during sports or home repair projects. Everyday wear and tear, especially as you get older, also can cause hand pain.  Most minor hand injuries will heal on their own, and home treatment is usually all you need to do. If you have sudden and severe pain, you may need tests and treatment.  Follow-up care is a key part of your treatment and safety. Be sure to make and go to all appointments, and call your doctor if you are having problems. It's also a good idea to know your test results and keep a list of the medicines you take.  How can you care for yourself at home?  Take pain medicines exactly as directed.  If the doctor gave you a prescription medicine for pain, take it as prescribed.  If you are not taking a prescription pain medicine, ask your doctor if you can take an over-the-counter medicine.  Rest and protect your hand. Take a break from any activity that may cause pain.  Put ice or a cold pack on your hand for 10 to 20 minutes at a time. Put a thin cloth between the ice and your skin.  Prop up the sore hand on a pillow when you ice it or anytime you sit or lie down during the next 3 days. Try to keep it above the level of your heart. This will help reduce swelling.  If your doctor recommends a sling, splint, or elastic bandage to support your hand, wear it as directed.  When should you call for help?   Call 911 anytime you think you may need emergency care. For example, call if:    Your hand turns cool or pale or changes color.   Call your doctor now or seek immediate medical care if:    You cannot move your hand.     Your hand pops, moves out of its normal position, and then returns to its normal position.     You have signs of infection, such as:  Increased pain, swelling, warmth, or redness.  Red streaks leading from the sore area.  Pus draining from a place on your hand.  A fever.     Your  foot 09/2022    LEFT FOOT / STEPPED ON NAIL    Wound, surgical, nonhealing     LEFT FOOT   S/P TOE AMPUTATION       PAST SURGICAL HISTORY    Past Surgical History:   Procedure Laterality Date    DIALYSIS FISTULA CREATION Left 10/20/2022    LEFT LOWER EXTREMITY ANGIOGRAM WITH BALLOON ANGIOPLASTY OF POSTERIOR TIBIAL ARTERY performed by Neela Mina MD at [address] [place] [address]  /  [address] [place] [address] Ne      FOOT SURGERY Left 11/22/2022    LEFT FOOT SKIN GRAFT SPLIT THICKNESS WITH WOUND BED PREPERATION AND VACVIA WOUND VAC (    FRACTURE SURGERY Left     leg    HAND SURGERY      KNEE SURGERY Left     LEG SURGERY Left     CASSIE TO LEFT LEG AFTER STEEL FELL ON IT    LEG SURGERY Left 11/02/2022    LEFT FOOT WOUND DEBRIDEMENT WITH MISONIX DEBRIDER AND INTEGRA GRAFT APPLICATION AND 2ND AND 3RD METATARSAL AMPUTATION performed by Shefali Ortiz DPM at [address] Left 11/02/2022    LEFT FOOT WOUND DEBRIDEMENT WITH MISONIX DEBRIDER AND INTEGRA GRAFT APPLICATION (Left)    SKIN GRAFT Left 11/22/2022    LEFT FOOT SKIN GRAFT SPLIT THICKNESS WITH WOUND BED PREPERATION AND VACVIA WOUND VAC performed by Shefali Ortiz DPM at [place] [address] Left 09/30/2022    LEFT 2ND AND 3RD TOE AMPUTATION WITH EXPLORATION, FILLET OF TOES 2 AND 3 performed by Shefali Ortiz DPM at [place] [address]    TRANSESOPHAGEAL ECHOCARDIOGRAM  02/02/2023    VASCULAR SURGERY Left 10/20/2022    LLE ANGIOGRAM   /  DR Vinicio Pickens       FAMILY HISTORY    Family History   Problem Relation Age of Onset    Diabetes Mother     Cancer Sister     Diabetes Brother        SOCIAL HISTORY    Social History     Tobacco Use    Smoking status: Former     Types: Cigarettes    Smokeless tobacco: Never   Vaping Use    Vaping Use: Never used   Substance Use Topics    Alcohol use: No    Drug use: No       ALLERGIES    No Known Allergies    MEDICATIONS    Current Outpatient Medications on File

## 2025-03-25 ENCOUNTER — APPOINTMENT (OUTPATIENT)
Age: 70
End: 2025-03-25
Payer: MEDICARE

## 2025-03-25 ENCOUNTER — HOSPITAL ENCOUNTER (EMERGENCY)
Age: 70
Discharge: HOME | End: 2025-03-25
Attending: EMERGENCY MEDICINE
Payer: MEDICARE

## 2025-03-25 VITALS
DIASTOLIC BLOOD PRESSURE: 71 MMHG | OXYGEN SATURATION: 97 % | TEMPERATURE: 98.2 F | WEIGHT: 150 LBS | RESPIRATION RATE: 18 BRPM | HEART RATE: 55 BPM | SYSTOLIC BLOOD PRESSURE: 142 MMHG | BODY MASS INDEX: 21 KG/M2 | HEIGHT: 71 IN

## 2025-03-25 DIAGNOSIS — K59.01 SLOW TRANSIT CONSTIPATION: Primary | ICD-10-CM

## 2025-03-25 PROCEDURE — 74018 RADEX ABDOMEN 1 VIEW: CPT

## 2025-03-25 PROCEDURE — 99283 EMERGENCY DEPT VISIT LOW MDM: CPT

## 2025-03-25 RX ORDER — SODIUM PHOSPHATE, DIBASIC AND SODIUM PHOSPHATE, MONOBASIC 7; 19 G/230ML; G/230ML
1 ENEMA RECTAL DAILY PRN
Qty: 3 ENEMA | Refills: 0 | Status: SHIPPED | OUTPATIENT
Start: 2025-03-25

## 2025-03-25 RX ORDER — POLYETHYLENE GLYCOL 3350 17 G/17G
17 POWDER, FOR SOLUTION ORAL 2 TIMES DAILY PRN
Qty: 238 G | Refills: 0 | Status: SHIPPED | OUTPATIENT
Start: 2025-03-25 | End: 2025-04-01

## 2025-03-25 RX ORDER — SENNA AND DOCUSATE SODIUM 50; 8.6 MG/1; MG/1
1 TABLET, FILM COATED ORAL DAILY
Qty: 30 TABLET | Refills: 0 | Status: SHIPPED | OUTPATIENT
Start: 2025-03-25 | End: 2025-04-24

## 2025-03-25 ASSESSMENT — PAIN - FUNCTIONAL ASSESSMENT: PAIN_FUNCTIONAL_ASSESSMENT: 0-10

## 2025-03-25 ASSESSMENT — LIFESTYLE VARIABLES
HOW MANY STANDARD DRINKS CONTAINING ALCOHOL DO YOU HAVE ON A TYPICAL DAY: PATIENT DOES NOT DRINK
HOW OFTEN DO YOU HAVE A DRINK CONTAINING ALCOHOL: NEVER

## 2025-03-25 ASSESSMENT — PAIN SCALES - GENERAL: PAINLEVEL_OUTOF10: 8

## 2025-03-25 ASSESSMENT — PAIN DESCRIPTION - LOCATION: LOCATION: ABDOMEN

## 2025-03-25 NOTE — ED TRIAGE NOTES
Pt states he has been having trouble having a BM and wants to see how much \"waste\" he has in his stomach.

## 2025-03-25 NOTE — ED PROVIDER NOTES
[facility] EMERGENCY DEPARTMENT  EMERGENCY DEPARTMENT HISTORY AND PHYSICAL EXAM        Pt Name: Angel Beckham  MRN: [id number]  Birthdate 1955  Date of evaluation: 3/25/2025  Provider: Art Wagner DO      History of Presenting Illness         Chief Complaint   Patient presents with    Constipation       History was provided by: Patient    Location/Duration/Severity/Modifying factors   Angel Beckham is a 69 y.o. male who arrived to the emergency department by by private vehicle with a complaint Constipation        Patient is a 61-year-old male present with constipation.  Says he is passing bowel movements with very small and hard and take a long time to pass.  He does not take anything consistently for constipation.  Does take oxycodone 5-3 25, prescribed twice daily.  He also takes gabapentin and metoprolol.  He says he was post have a colonoscopy in November but a hip surgery got in the way and he needs to reschedule it.  Not having any abdominal pain vomiting nausea fever or chills.          There are no other complaints, changes, or physical findings at this time.    PCP: To Miles MD    No current facility-administered medications for this encounter.     Current Outpatient Medications   Medication Sig Dispense Refill    magnesium citrate solution Take 296 mLs by mouth once for 1 dose Take with at least 12 ounces of water 296 mL 0    sennosides-docusate sodium (SENOKOT-S) 8.6-50 MG tablet Take 1 tablet by mouth daily 30 tablet 0    polyethylene glycol (GLYCOLAX) 17 GM/SCOOP powder Take 17 g by mouth 2 times daily as needed (COnstipation) 238 g 0    sodium phosphate (FLEET) Place 1 enema rectally daily as needed (Constipation) 3 enema 0    tadalafil (CIALIS) 20 MG tablet Take 1 tablet by mouth daily as needed for Erectile Dysfunction 30 tablet 1    tolterodine (DETROL LA) 4 MG extended release capsule Take 1 capsule by mouth daily 90 capsule 1    tadalafil (CIALIS) 5 MG

## 2025-03-25 NOTE — DISCHARGE INSTRUCTIONS
1.  Start taking the medicine I prescribed you.  Tonight take the magnesium citrate with a large glass of water.  This may or may not have a noticeable effect.  If it does not, start tomorrow with the MiraLAX 2 times per day, then you can go to daily and then as needed after your bowels start moving.    The senna and docusate as a stool softener just to take every day    The Fleet enemas you can use for constipation as well        Thank you for choosing our Emergency Department for your care.  It is our privilege to care for you in your time of need.  In the next several days, you may receive a survey via email or mailed to your home about your experience with our team.  We would greatly appreciate you taking a few minutes to complete the survey, as we use this information to learn what we have done well and what we could be doing better. Thank you for trusting us with your care!    Below you will find a list of your tests from today's visit.   Labs and Radiology Studies  No results found for this or any previous visit (from the past 12 hours).  XR ABDOMEN (KUB) (SINGLE AP VIEW)  Result Date: 3/25/2025  EXAM:  XR ABDOMEN (KUB) (SINGLE AP VIEW) INDICATION: Constipation COMPARISON: 10/29/2023. TECHNIQUE: Supine frontal abdomen (KUB). FINDINGS: No dilated small bowel. A mild to moderate amount of stool and gas are present in the colon. No pathologic calcification. The patient is status post lumbar fusion. The patient is status post left total hip arthroplasty. Avascular necrosis is again seen in the right femoral head without collapse.     No evidence of acute process. Electronically signed by DANIEL PAGAN    ------------------------------------------------------------------------------------------------------------  The evaluation and treatment you received in the Emergency Department were for an urgent problem. It is important that you follow-up with a doctor, nurse practitioner, or physician assistant to:  (1)

## 2025-04-08 ENCOUNTER — ANESTHESIA EVENT (OUTPATIENT)
Age: 70
End: 2025-04-08
Payer: MEDICARE

## 2025-04-09 ENCOUNTER — HOSPITAL ENCOUNTER (OUTPATIENT)
Age: 70
Setting detail: OUTPATIENT SURGERY
Discharge: HOME | End: 2025-04-09
Attending: OPHTHALMOLOGY | Admitting: OPHTHALMOLOGY
Payer: MEDICARE

## 2025-04-09 ENCOUNTER — ANESTHESIA (OUTPATIENT)
Age: 70
End: 2025-04-09
Payer: MEDICARE

## 2025-04-09 VITALS
TEMPERATURE: 97.4 F | SYSTOLIC BLOOD PRESSURE: 146 MMHG | RESPIRATION RATE: 16 BRPM | OXYGEN SATURATION: 97 % | HEART RATE: 54 BPM | DIASTOLIC BLOOD PRESSURE: 70 MMHG

## 2025-04-09 PROCEDURE — 7100000010 HC PHASE II RECOVERY - FIRST 15 MIN: Performed by: OPHTHALMOLOGY

## 2025-04-09 PROCEDURE — 3600000012 HC SURGERY LEVEL 2 ADDTL 15MIN: Performed by: OPHTHALMOLOGY

## 2025-04-09 PROCEDURE — 3600000002 HC SURGERY LEVEL 2 BASE: Performed by: OPHTHALMOLOGY

## 2025-04-09 PROCEDURE — 2500000003 HC RX 250 WO HCPCS: Performed by: OPHTHALMOLOGY

## 2025-04-09 PROCEDURE — 6370000000 HC RX 637 (ALT 250 FOR IP): Performed by: OPHTHALMOLOGY

## 2025-04-09 PROCEDURE — 3700000000 HC ANESTHESIA ATTENDED CARE: Performed by: OPHTHALMOLOGY

## 2025-04-09 PROCEDURE — V2632 POST CHMBR INTRAOCULAR LENS: HCPCS | Performed by: OPHTHALMOLOGY

## 2025-04-09 PROCEDURE — 3700000001 HC ADD 15 MINUTES (ANESTHESIA): Performed by: OPHTHALMOLOGY

## 2025-04-09 PROCEDURE — 2709999900 HC NON-CHARGEABLE SUPPLY: Performed by: OPHTHALMOLOGY

## 2025-04-09 PROCEDURE — 6360000002 HC RX W HCPCS: Performed by: OPHTHALMOLOGY

## 2025-04-09 PROCEDURE — 6360000002 HC RX W HCPCS: Performed by: NURSE ANESTHETIST, CERTIFIED REGISTERED

## 2025-04-09 DEVICE — TECNIS SIMPLICITY TECNIS EYHANCE U 21.0D
Type: IMPLANTABLE DEVICE | Site: EYE | Status: FUNCTIONAL
Brand: TECNIS SIMPLICITY

## 2025-04-09 RX ORDER — TRIAMCINOLONE ACETONIDE 40 MG/ML
INJECTION, SUSPENSION INTRA-ARTICULAR; INTRAMUSCULAR PRN
Status: DISCONTINUED | OUTPATIENT
Start: 2025-04-09 | End: 2025-04-09 | Stop reason: ALTCHOICE

## 2025-04-09 RX ORDER — SODIUM CHLORIDE, SODIUM LACTATE, POTASSIUM CHLORIDE, CALCIUM CHLORIDE 600; 310; 30; 20 MG/100ML; MG/100ML; MG/100ML; MG/100ML
INJECTION, SOLUTION INTRAVENOUS CONTINUOUS
Status: CANCELLED | OUTPATIENT
Start: 2025-04-09

## 2025-04-09 RX ORDER — SODIUM CHLORIDE 0.9 % (FLUSH) 0.9 %
5-40 SYRINGE (ML) INJECTION EVERY 12 HOURS SCHEDULED
Status: CANCELLED | OUTPATIENT
Start: 2025-04-09

## 2025-04-09 RX ORDER — MOXIFLOXACIN 5 MG/ML
SOLUTION/ DROPS OPHTHALMIC PRN
Status: DISCONTINUED | OUTPATIENT
Start: 2025-04-09 | End: 2025-04-09 | Stop reason: ALTCHOICE

## 2025-04-09 RX ORDER — SODIUM CHLORIDE 0.9 % (FLUSH) 0.9 %
5-40 SYRINGE (ML) INJECTION PRN
Status: DISCONTINUED | OUTPATIENT
Start: 2025-04-09 | End: 2025-04-09 | Stop reason: HOSPADM

## 2025-04-09 RX ORDER — SODIUM CHLORIDE 9 MG/ML
INJECTION, SOLUTION INTRAVENOUS PRN
Status: CANCELLED | OUTPATIENT
Start: 2025-04-09

## 2025-04-09 RX ORDER — MIDAZOLAM HYDROCHLORIDE 1 MG/ML
INJECTION, SOLUTION INTRAMUSCULAR; INTRAVENOUS
Status: DISCONTINUED | OUTPATIENT
Start: 2025-04-09 | End: 2025-04-09 | Stop reason: SDUPTHER

## 2025-04-09 RX ORDER — SODIUM CHLORIDE 0.9 % (FLUSH) 0.9 %
5-40 SYRINGE (ML) INJECTION EVERY 12 HOURS SCHEDULED
Status: DISCONTINUED | OUTPATIENT
Start: 2025-04-09 | End: 2025-04-09 | Stop reason: HOSPADM

## 2025-04-09 RX ORDER — EPINEPHRINE 1 MG/ML(1)
AMPUL (ML) INJECTION PRN
Status: DISCONTINUED | OUTPATIENT
Start: 2025-04-09 | End: 2025-04-09 | Stop reason: ALTCHOICE

## 2025-04-09 RX ORDER — TETRACAINE HYDROCHLORIDE 5 MG/ML
SOLUTION OPHTHALMIC PRN
Status: DISCONTINUED | OUTPATIENT
Start: 2025-04-09 | End: 2025-04-09 | Stop reason: ALTCHOICE

## 2025-04-09 RX ORDER — NALOXONE HYDROCHLORIDE 0.4 MG/ML
INJECTION, SOLUTION INTRAMUSCULAR; INTRAVENOUS; SUBCUTANEOUS PRN
Status: CANCELLED | OUTPATIENT
Start: 2025-04-09

## 2025-04-09 RX ADMIN — MIDAZOLAM 2 MG: 1 INJECTION INTRAMUSCULAR; INTRAVENOUS at 13:55

## 2025-04-09 RX ADMIN — Medication: at 10:51

## 2025-04-09 ASSESSMENT — PAIN - FUNCTIONAL ASSESSMENT
PAIN_FUNCTIONAL_ASSESSMENT: 0-10
PAIN_FUNCTIONAL_ASSESSMENT: NONE - DENIES PAIN

## 2025-04-09 ASSESSMENT — ENCOUNTER SYMPTOMS: SHORTNESS OF BREATH: 1

## 2025-04-09 NOTE — DISCHARGE INSTRUCTIONS
POST-OPERATIVE INSTRUCTIONS FOR CATARACT SURGERY     1. No heavy lifting (no more than 5 pounds). No Bending at waist and No strenuous activity for one (1) week.     2. DO NOT RUB YOUR EYE!!! Wear eye protection at all times when going outdoors (glasses, sunglasses,        ect) and wear shield while sleeping/napping for the first week after surgery.      3. DO NOT GET WATER IN YOUR EYES FOR THE NEXT 7 DAYS. You may gently clean your face and you        may shower, but don't put any pressure on the eye. Ladies, no eye makeup for one (1) week after surgery.        Do not swim or get into a hot tub or pool for three (3) weeks.     4. You may experience eye irritation, aching, itching and blurred vision for several days. You may use over the         Counter PRESERVATIVE FREE Refresh or Systane as needed.  Use Tylenol or Ibuprofen (Motrin) for         Discomfort but DO NOT RUB YOUR EYE .     5. Call your doctor with any increased pain, redness, nausea, vomiting, fever, or worsening vision. Also call your doctor        with new flashes of light, many new floaters or a curtain/veil coming into your vision.                                               WASH YOUR HANDS BEFORE PUTTING IN YOUR DROPS.                                       ALLOW 5 MINUTES BETWEEN DIFFERENT DROPS.                          IF YOU HAVE ANY QUESTION OR CONCERNS:     DURING OFFICE HOURS CALL [phone]   OR AFTER HOURS / WEEKENDS CALL OR TEXT [phone]     If you are unable to reach us, call [phone] or go to the nearest Emergency Department

## 2025-04-09 NOTE — ANESTHESIA PRE PROCEDURE
GI/Hepatic/Renal: Neg GI/Hepatic/Renal ROS  (+) GERD:          Endo/Other: Negative Endo/Other ROS   (+) hypothyroidism, hyperthyroidism::..                 Abdominal:             Vascular: negative vascular ROS.         Other Findings:       Anesthesia Plan      MAC     ASA 3       Induction: intravenous.  continuous noninvasive hemodynamic monitor    Anesthetic plan and risks discussed with patient.    Use of blood products discussed with patient whom.    Plan discussed with surgical team, proceduralist/surgeon and CRNA.                SUKH Aguiar - CRNA   4/9/2025

## 2025-04-09 NOTE — ANESTHESIA POSTPROCEDURE EVALUATION
Department of Anesthesiology  Postprocedure Note    Patient: Angel Beckham  MRN: [id number]  YOB: 1955  Date of evaluation: 4/9/2025    Procedure Summary       Date: 04/09/25 Room / Location: [facility] MAIN 02 / [facility] MAIN OR    Anesthesia Start: 1355 Anesthesia Stop: 1412    Procedure: PHACO IOL OD (Right: Eye) Diagnosis:       Nuclear sclerotic cataract of right eye      (Nuclear sclerotic cataract of right eye [H25.11])    Surgeons: Carolina Gar MD Responsible Provider: Billy Burns APRN - CRNA    Anesthesia Type: MAC ASA Status: 3            Anesthesia Type: MAC    Selvin Phase I:      Selvin Phase II:      Anesthesia Post Evaluation    Patient location during evaluation: bedside  Patient participation: complete - patient participated  Level of consciousness: awake and alert  Airway patency: patent  Nausea & Vomiting: no nausea and no vomiting  Cardiovascular status: hemodynamically stable and blood pressure returned to baseline  Respiratory status: acceptable and room air  Hydration status: euvolemic  Pain management: adequate    No notable events documented.

## 2025-04-09 NOTE — H&P
vomiting.  Genitourinary:  Negative for dysuria.  Musculoskeletal:  Negative for myalgias.  Skin:  Negative for rash.  Neurological:  Negative for dizziness and headaches.      Vitals:    04/09/25 1045   BP: (!) 161/73   Pulse: 53   Resp: 18   Temp: 98 °F (36.7 °C)   SpO2: 100%       Alert & Oriented x 3  CV: Regular Rate, Regular rhythm, no murmurs/rubs/gallops  Pulm: Clear to auscultation bilaterally, no wheezes/rales/rhonchi  Neuro: CNII-XII grossly intact      Proceed with Surgery as planned.     Signed By:Carolina Gar MD   04/09/25, 1:20 PM

## 2025-05-01 ENCOUNTER — ANESTHESIA EVENT (OUTPATIENT)
Age: 70
End: 2025-05-01
Payer: MEDICARE

## 2025-05-01 RX ORDER — SODIUM CHLORIDE 9 MG/ML
INJECTION, SOLUTION INTRAVENOUS PRN
Status: CANCELLED | OUTPATIENT
Start: 2025-05-01

## 2025-05-01 RX ORDER — SODIUM CHLORIDE 0.9 % (FLUSH) 0.9 %
5-40 SYRINGE (ML) INJECTION PRN
Status: CANCELLED | OUTPATIENT
Start: 2025-05-01

## 2025-05-01 RX ORDER — SODIUM CHLORIDE 0.9 % (FLUSH) 0.9 %
5-40 SYRINGE (ML) INJECTION EVERY 12 HOURS SCHEDULED
Status: CANCELLED | OUTPATIENT
Start: 2025-05-01

## 2025-05-05 ENCOUNTER — APPOINTMENT (OUTPATIENT)
Age: 70
End: 2025-05-05
Payer: MEDICARE

## 2025-05-05 ENCOUNTER — HOSPITAL ENCOUNTER (EMERGENCY)
Age: 70
Discharge: HOME | End: 2025-05-05
Attending: STUDENT IN AN ORGANIZED HEALTH CARE EDUCATION/TRAINING PROGRAM
Payer: MEDICARE

## 2025-05-05 VITALS
TEMPERATURE: 97.5 F | SYSTOLIC BLOOD PRESSURE: 107 MMHG | DIASTOLIC BLOOD PRESSURE: 71 MMHG | HEIGHT: 71 IN | BODY MASS INDEX: 21.7 KG/M2 | OXYGEN SATURATION: 94 % | WEIGHT: 155 LBS | RESPIRATION RATE: 18 BRPM | HEART RATE: 75 BPM

## 2025-05-05 DIAGNOSIS — K59.00 CONSTIPATION, UNSPECIFIED CONSTIPATION TYPE: Primary | ICD-10-CM

## 2025-05-05 DIAGNOSIS — R79.89 ELEVATED SERUM CREATININE: ICD-10-CM

## 2025-05-05 DIAGNOSIS — N39.0 URINARY TRACT INFECTION WITHOUT HEMATURIA, SITE UNSPECIFIED: ICD-10-CM

## 2025-05-05 LAB
ALBUMIN SERPL-MCNC: 3.8 G/DL (ref 3.4–5)
ALBUMIN/GLOB SERPL: 1.1 (ref 0.8–1.7)
ALP SERPL-CCNC: 77 U/L (ref 45–117)
ALT SERPL-CCNC: 24 U/L (ref 16–61)
ANION GAP SERPL CALC-SCNC: 7 MMOL/L (ref 3–18)
APPEARANCE UR: CLEAR
AST SERPL W P-5'-P-CCNC: 16 U/L (ref 10–38)
BACTERIA URNS QL MICRO: ABNORMAL /HPF
BASOPHILS # BLD: 0.07 K/UL (ref 0–0.1)
BASOPHILS NFR BLD: 0.8 % (ref 0–2)
BILIRUB SERPL-MCNC: 0.6 MG/DL (ref 0.2–1)
BILIRUB UR QL: NEGATIVE
BUN SERPL-MCNC: 23 MG/DL (ref 7–18)
BUN/CREAT SERPL: 13 (ref 12–20)
CA-I BLD-MCNC: 9.9 MG/DL (ref 8.5–10.1)
CHLORIDE SERPL-SCNC: 103 MMOL/L (ref 100–111)
CO2 SERPL-SCNC: 29 MMOL/L (ref 21–32)
COLOR UR: YELLOW
CREAT SERPL-MCNC: 1.79 MG/DL (ref 0.6–1.3)
DIFFERENTIAL METHOD BLD: ABNORMAL
EKG ATRIAL RATE: 61 BPM
EKG DIAGNOSIS: NORMAL
EKG P AXIS: 61 DEGREES
EKG P-R INTERVAL: 144 MS
EKG Q-T INTERVAL: 444 MS
EKG QRS DURATION: 92 MS
EKG QTC CALCULATION (BAZETT): 446 MS
EKG R AXIS: 42 DEGREES
EKG T AXIS: 69 DEGREES
EKG VENTRICULAR RATE: 61 BPM
EOSINOPHIL # BLD: 0.12 K/UL (ref 0–0.4)
EOSINOPHIL NFR BLD: 1.3 % (ref 0–5)
EPITH CASTS URNS QL MICRO: ABNORMAL /LPF (ref 0–20)
ERYTHROCYTE [DISTWIDTH] IN BLOOD BY AUTOMATED COUNT: 15 % (ref 11.6–14.5)
GLOBULIN SER CALC-MCNC: 3.4 G/DL (ref 2–4)
GLUCOSE SERPL-MCNC: 113 MG/DL (ref 74–99)
GLUCOSE UR STRIP.AUTO-MCNC: NEGATIVE MG/DL
HCT VFR BLD AUTO: 38.8 % (ref 36–48)
HGB BLD-MCNC: 13.1 G/DL (ref 13–16)
HGB UR QL STRIP: NEGATIVE
IMM GRANULOCYTES # BLD AUTO: 0.05 K/UL (ref 0–0.04)
IMM GRANULOCYTES NFR BLD AUTO: 0.6 % (ref 0–0.5)
KETONES UR QL STRIP.AUTO: NEGATIVE MG/DL
LEUKOCYTE ESTERASE UR QL STRIP.AUTO: ABNORMAL
LIPASE SERPL-CCNC: 44 U/L (ref 13–75)
LYMPHOCYTES # BLD: 1.89 K/UL (ref 0.9–3.6)
LYMPHOCYTES NFR BLD: 21.2 % (ref 21–52)
MCH RBC QN AUTO: 28.9 PG (ref 24–34)
MCHC RBC AUTO-ENTMCNC: 33.8 G/DL (ref 31–37)
MCV RBC AUTO: 85.5 FL (ref 78–100)
MONOCYTES # BLD: 1.11 K/UL (ref 0.05–1.2)
MONOCYTES NFR BLD: 12.4 % (ref 3–10)
NEUTS SEG # BLD: 5.69 K/UL (ref 1.8–8)
NEUTS SEG NFR BLD: 63.7 % (ref 40–73)
NITRITE UR QL STRIP.AUTO: POSITIVE
NRBC # BLD: 0 K/UL (ref 0–0.01)
NRBC BLD-RTO: 0 PER 100 WBC
PH UR STRIP: 5 (ref 5–8)
PLATELET # BLD AUTO: 322 K/UL (ref 135–420)
PMV BLD AUTO: 9.1 FL (ref 9.2–11.8)
POTASSIUM SERPL-SCNC: 3.8 MMOL/L (ref 3.5–5.5)
PROT SERPL-MCNC: 7.2 G/DL (ref 6.4–8.2)
PROT UR STRIP-MCNC: ABNORMAL MG/DL
RBC # BLD AUTO: 4.54 M/UL (ref 4.35–5.65)
RBC #/AREA URNS HPF: ABNORMAL /HPF (ref 0–2)
SODIUM SERPL-SCNC: 139 MMOL/L (ref 136–145)
SP GR UR REFRACTOMETRY: 1.01 (ref 1–1.03)
TROPONIN I SERPL HS-MCNC: 21 NG/L (ref 0–78)
URINE CULTURE IF INDICATED: ABNORMAL
UROBILINOGEN UR QL STRIP.AUTO: 0.2 EU/DL (ref 0.2–1)
WBC # BLD AUTO: 8.9 K/UL (ref 4.6–13.2)
WBC URNS QL MICRO: ABNORMAL /HPF (ref 0–4)

## 2025-05-05 PROCEDURE — 96360 HYDRATION IV INFUSION INIT: CPT

## 2025-05-05 PROCEDURE — 99285 EMERGENCY DEPT VISIT HI MDM: CPT

## 2025-05-05 PROCEDURE — 85025 COMPLETE CBC W/AUTO DIFF WBC: CPT

## 2025-05-05 PROCEDURE — 93005 ELECTROCARDIOGRAM TRACING: CPT | Performed by: STUDENT IN AN ORGANIZED HEALTH CARE EDUCATION/TRAINING PROGRAM

## 2025-05-05 PROCEDURE — 96361 HYDRATE IV INFUSION ADD-ON: CPT

## 2025-05-05 PROCEDURE — 81001 URINALYSIS AUTO W/SCOPE: CPT

## 2025-05-05 PROCEDURE — 87086 URINE CULTURE/COLONY COUNT: CPT

## 2025-05-05 PROCEDURE — 74177 CT ABD & PELVIS W/CONTRAST: CPT

## 2025-05-05 PROCEDURE — 83690 ASSAY OF LIPASE: CPT

## 2025-05-05 PROCEDURE — 87186 SC STD MICRODIL/AGAR DIL: CPT

## 2025-05-05 PROCEDURE — 6360000004 HC RX CONTRAST MEDICATION: Performed by: STUDENT IN AN ORGANIZED HEALTH CARE EDUCATION/TRAINING PROGRAM

## 2025-05-05 PROCEDURE — 80053 COMPREHEN METABOLIC PANEL: CPT

## 2025-05-05 PROCEDURE — 84484 ASSAY OF TROPONIN QUANT: CPT

## 2025-05-05 PROCEDURE — 2580000003 HC RX 258: Performed by: STUDENT IN AN ORGANIZED HEALTH CARE EDUCATION/TRAINING PROGRAM

## 2025-05-05 PROCEDURE — 87088 URINE BACTERIA CULTURE: CPT

## 2025-05-05 RX ORDER — IOPAMIDOL 755 MG/ML
100 INJECTION, SOLUTION INTRAVASCULAR
Status: COMPLETED | OUTPATIENT
Start: 2025-05-05 | End: 2025-05-05

## 2025-05-05 RX ORDER — SODIUM CHLORIDE, SODIUM LACTATE, POTASSIUM CHLORIDE, AND CALCIUM CHLORIDE .6; .31; .03; .02 G/100ML; G/100ML; G/100ML; G/100ML
1000 INJECTION, SOLUTION INTRAVENOUS ONCE
Status: COMPLETED | OUTPATIENT
Start: 2025-05-05 | End: 2025-05-05

## 2025-05-05 RX ORDER — CEFUROXIME AXETIL 250 MG/1
250 TABLET ORAL 2 TIMES DAILY
Qty: 14 TABLET | Refills: 0 | Status: SHIPPED | OUTPATIENT
Start: 2025-05-05 | End: 2025-05-12

## 2025-05-05 RX ADMIN — SODIUM CHLORIDE, SODIUM LACTATE, POTASSIUM CHLORIDE, AND CALCIUM CHLORIDE 1000 ML: .6; .31; .03; .02 INJECTION, SOLUTION INTRAVENOUS at 12:22

## 2025-05-05 RX ADMIN — IOPAMIDOL 100 ML: 755 INJECTION, SOLUTION INTRAVENOUS at 13:35

## 2025-05-05 ASSESSMENT — ENCOUNTER SYMPTOMS
DIARRHEA: 0
CHEST TIGHTNESS: 0
NAUSEA: 0
RECTAL PAIN: 0
CONSTIPATION: 1
SHORTNESS OF BREATH: 0
BLOOD IN STOOL: 0
ABDOMINAL DISTENTION: 1
ABDOMINAL PAIN: 1

## 2025-05-05 ASSESSMENT — PAIN DESCRIPTION - LOCATION: LOCATION: ABDOMEN

## 2025-05-05 ASSESSMENT — PAIN SCALES - GENERAL: PAINLEVEL_OUTOF10: 7

## 2025-05-05 ASSESSMENT — LIFESTYLE VARIABLES
HOW OFTEN DO YOU HAVE A DRINK CONTAINING ALCOHOL: 4 OR MORE TIMES A WEEK
HOW MANY STANDARD DRINKS CONTAINING ALCOHOL DO YOU HAVE ON A TYPICAL DAY: 3 OR 4

## 2025-05-05 ASSESSMENT — PAIN - FUNCTIONAL ASSESSMENT: PAIN_FUNCTIONAL_ASSESSMENT: 0-10

## 2025-05-05 NOTE — ED PROVIDER NOTES
[facility] EMERGENCY DEPARTMENT  EMERGENCY DEPARTMENT ENCOUNTER      Pt Name: Angel Beckham  MRN: [id number]  Birthdate 1955  Date of evaluation: 5/5/2025  Provider: Billy Veloz MD   PCP: To Miles MD  Note Started: 4:22 PM 5/5/25     CHIEF COMPLAINT       Chief Complaint   Patient presents with    Dizziness    Constipation        HISTORY OF PRESENT ILLNESS: 1 or more elements      History From: Patient  History limited by: Nothing     Angel Beckham is a 69 y.o. male who presents for evaluation of abdominal discomfort.  Patient reports generalized abdominal comfort.  Also reports constipation.  Has not had a bowel movement that has been significant about 2 weeks.  Also has intermittent lightheadedness.  No weakness or numbness.  No visual changes.  No reported chest pain or shortness of breath.  States he is ambulating at his baseline     Nursing Notes were all reviewed and agreed with or any disagreements were addressed in the HPI.     REVIEW OF SYSTEMS      Review of Systems   Constitutional:  Negative for chills and fever.   HENT:  Negative for congestion.    Respiratory:  Negative for chest tightness and shortness of breath.    Cardiovascular:  Negative for chest pain.   Gastrointestinal:  Positive for abdominal distention, abdominal pain and constipation. Negative for blood in stool, diarrhea, nausea and rectal pain.   Genitourinary:  Negative for dysuria and frequency.   Neurological:  Positive for light-headedness.        Positives and Pertinent negatives as per HPI.    PAST HISTORY     Past Medical History:  Past Medical History:   Diagnosis Date    Acid reflux     Back pain     Cervical spondylosis     Gurdeep Gilliland    Constipation     using linzess    DVT (deep venous thrombosis) ([facility])     left leg, unkown year    Erectile dysfunction     Vernon Aparicio    Hypertension     Hypothyroidism     Incontinence 12/10/2020    fecal and urinary, Vernon Aparicio    Muscle

## 2025-05-05 NOTE — DISCHARGE INSTRUCTIONS
Please take MiraLAX twice a day for the next 4 to 5 days.  After that please continue to take MiraLAX daily.  Please take the antibiotics as prescribed.  As we discussed your serum creatinine level was slightly elevated to 1.79 at today's visit.  Please obtain repeat blood work in about 3 to 5 days for repeat serum creatinine level testing.  Please avoid any NSAIDs including ibuprofen, Tylenol as well as aspirin.

## 2025-05-07 LAB
BACTERIA SPEC CULT: ABNORMAL
COLONY COUNT, CNT: ABNORMAL
Lab: ABNORMAL

## 2025-05-14 ENCOUNTER — ANESTHESIA (OUTPATIENT)
Age: 70
End: 2025-05-14
Payer: MEDICARE

## 2025-05-14 ENCOUNTER — HOSPITAL ENCOUNTER (OUTPATIENT)
Age: 70
Setting detail: OUTPATIENT SURGERY
Discharge: HOME | End: 2025-05-14
Attending: OPHTHALMOLOGY | Admitting: OPHTHALMOLOGY
Payer: MEDICARE

## 2025-05-14 VITALS
TEMPERATURE: 98.1 F | RESPIRATION RATE: 16 BRPM | SYSTOLIC BLOOD PRESSURE: 146 MMHG | HEART RATE: 70 BPM | DIASTOLIC BLOOD PRESSURE: 73 MMHG | OXYGEN SATURATION: 96 %

## 2025-05-14 PROCEDURE — 2500000003 HC RX 250 WO HCPCS: Performed by: OPHTHALMOLOGY

## 2025-05-14 PROCEDURE — 6360000002 HC RX W HCPCS: Performed by: NURSE ANESTHETIST, CERTIFIED REGISTERED

## 2025-05-14 PROCEDURE — 3700000000 HC ANESTHESIA ATTENDED CARE: Performed by: OPHTHALMOLOGY

## 2025-05-14 PROCEDURE — V2632 POST CHMBR INTRAOCULAR LENS: HCPCS | Performed by: OPHTHALMOLOGY

## 2025-05-14 PROCEDURE — 7100000010 HC PHASE II RECOVERY - FIRST 15 MIN: Performed by: OPHTHALMOLOGY

## 2025-05-14 PROCEDURE — 6360000002 HC RX W HCPCS: Performed by: OPHTHALMOLOGY

## 2025-05-14 PROCEDURE — 6370000000 HC RX 637 (ALT 250 FOR IP): Performed by: OPHTHALMOLOGY

## 2025-05-14 PROCEDURE — 3700000001 HC ADD 15 MINUTES (ANESTHESIA): Performed by: OPHTHALMOLOGY

## 2025-05-14 PROCEDURE — 2709999900 HC NON-CHARGEABLE SUPPLY: Performed by: OPHTHALMOLOGY

## 2025-05-14 PROCEDURE — 3600000012 HC SURGERY LEVEL 2 ADDTL 15MIN: Performed by: OPHTHALMOLOGY

## 2025-05-14 PROCEDURE — 3600000002 HC SURGERY LEVEL 2 BASE: Performed by: OPHTHALMOLOGY

## 2025-05-14 DEVICE — TECNIS SIMPLICITY TECNIS EYHANCE U 22.0D
Type: IMPLANTABLE DEVICE | Site: EYE | Status: FUNCTIONAL
Brand: TECNIS SIMPLICITY

## 2025-05-14 RX ORDER — EPINEPHRINE 1 MG/ML(1)
AMPUL (ML) INJECTION PRN
Status: DISCONTINUED | OUTPATIENT
Start: 2025-05-14 | End: 2025-05-14 | Stop reason: ALTCHOICE

## 2025-05-14 RX ORDER — SODIUM CHLORIDE 9 MG/ML
INJECTION, SOLUTION INTRAVENOUS PRN
Status: DISCONTINUED | OUTPATIENT
Start: 2025-05-14 | End: 2025-05-14 | Stop reason: HOSPADM

## 2025-05-14 RX ORDER — SODIUM CHLORIDE 0.9 % (FLUSH) 0.9 %
5-40 SYRINGE (ML) INJECTION EVERY 12 HOURS SCHEDULED
Status: DISCONTINUED | OUTPATIENT
Start: 2025-05-14 | End: 2025-05-14 | Stop reason: HOSPADM

## 2025-05-14 RX ORDER — TRIAMCINOLONE ACETONIDE 40 MG/ML
INJECTION, SUSPENSION INTRA-ARTICULAR; INTRAMUSCULAR PRN
Status: DISCONTINUED | OUTPATIENT
Start: 2025-05-14 | End: 2025-05-14 | Stop reason: ALTCHOICE

## 2025-05-14 RX ORDER — MIDAZOLAM HYDROCHLORIDE 1 MG/ML
INJECTION, SOLUTION INTRAMUSCULAR; INTRAVENOUS
Status: DISCONTINUED | OUTPATIENT
Start: 2025-05-14 | End: 2025-05-14 | Stop reason: SDUPTHER

## 2025-05-14 RX ORDER — MOXIFLOXACIN 5 MG/ML
SOLUTION/ DROPS OPHTHALMIC PRN
Status: DISCONTINUED | OUTPATIENT
Start: 2025-05-14 | End: 2025-05-14 | Stop reason: ALTCHOICE

## 2025-05-14 RX ORDER — TETRACAINE HYDROCHLORIDE 5 MG/ML
SOLUTION OPHTHALMIC PRN
Status: DISCONTINUED | OUTPATIENT
Start: 2025-05-14 | End: 2025-05-14 | Stop reason: ALTCHOICE

## 2025-05-14 RX ORDER — SODIUM CHLORIDE 0.9 % (FLUSH) 0.9 %
5-40 SYRINGE (ML) INJECTION PRN
Status: DISCONTINUED | OUTPATIENT
Start: 2025-05-14 | End: 2025-05-14 | Stop reason: HOSPADM

## 2025-05-14 RX ADMIN — MIDAZOLAM 2 MG: 1 INJECTION INTRAMUSCULAR; INTRAVENOUS at 10:38

## 2025-05-14 RX ADMIN — Medication: at 09:33

## 2025-05-14 ASSESSMENT — PAIN - FUNCTIONAL ASSESSMENT
PAIN_FUNCTIONAL_ASSESSMENT: 0-10
PAIN_FUNCTIONAL_ASSESSMENT: NONE - DENIES PAIN

## 2025-05-14 ASSESSMENT — ENCOUNTER SYMPTOMS: SHORTNESS OF BREATH: 0

## 2025-05-14 NOTE — ANESTHESIA PRE PROCEDURE
Department of Anesthesiology  Preprocedure Note       Name:  Angel Beckham   Age:  69 y.o.  :  1955                                          MRN:  [id number]         Date:  2025      Surgeon: Surgeon(s):  Carolina Gar MD    Procedure: Procedure(s):  PHACO IOL OS    Medications prior to admission:   Prior to Admission medications    Medication Sig Start Date End Date Taking? Authorizing Provider   tadalafil (CIALIS) 20 MG tablet Take 1 tablet by mouth daily as needed for Erectile Dysfunction 24  Yes Ed Dowling PA-C   tolterodine (DETROL LA) 4 MG extended release capsule Take 1 capsule by mouth daily 24  Yes Ed Dowling PA-C   tadalafil (CIALIS) 5 MG tablet Take 1 tablet by mouth daily 24  Yes Ed Dowling PA-C   levothyroxine (SYNTHROID) 75 MCG tablet Take 1 tablet by mouth Daily 24  Yes Bennie Hillman MD   metoprolol tartrate (LOPRESSOR) 25 MG tablet Take 1 tablet by mouth 2 times daily   Yes ProviderBennie MD   DULoxetine (CYMBALTA) 30 MG extended release capsule Take 1 capsule by mouth daily 23  Yes Bennie Hillman MD   atorvastatin (LIPITOR) 20 MG tablet Take 1 tablet by mouth daily 23  Yes Elizabeth Villasenor MD   omeprazole (PRILOSEC) 40 MG delayed release capsule TAKE 1 CAPSULE BY MOUTH EVERY DAY 30 MINUTES BEFORE BREAKFAST 23  Yes ProviderBennie MD   gabapentin (NEURONTIN) 300 MG capsule Take 1 capsule by mouth 3 times daily.   Yes ProviderBennie MD   Multiple Vitamins-Minerals (ONE-A-DAY MENS 50+ PO) Take by mouth   Yes Bennie Hillman MD   traZODone (DESYREL) 50 MG tablet Take 1 tablet by mouth nightly Indications: sleep aid   Yes Bennie Hillman MD   oxyCODONE-acetaminophen (PERCOCET) 5-325 MG per tablet Take 1 tablet by mouth every 6 hours as needed.  Patient not taking: Reported on 2025   Bennie Hillman MD   fluticasone (FLONASE) 50 MCG/ACT nasal spray as needed

## 2025-05-14 NOTE — ANESTHESIA POSTPROCEDURE EVALUATION
Department of Anesthesiology  Postprocedure Note    Patient: Angel Beckham  MRN: [id number]  YOB: 1955  Date of evaluation: 5/14/2025    Procedure Summary       Date: 05/14/25 Room / Location: [facility] MAIN 02 / [facility] MAIN OR    Anesthesia Start: 1035 Anesthesia Stop: 1051    Procedure: PHACO IOL OS (Left: Eye) Diagnosis:       Nuclear sclerotic cataract of left eye      (Nuclear sclerotic cataract of left eye [H25.12])    Surgeons: Carolina Gar MD Responsible Provider: Marina Ortiz APRN - CRNA    Anesthesia Type: MAC ASA Status: 3            Anesthesia Type: MAC    Selvin Phase I:      Selvin Phase II:      Anesthesia Post Evaluation    Patient location during evaluation: PACU  Level of consciousness: awake and alert  Pain score: 0  Airway patency: patent  Nausea & Vomiting: no nausea and no vomiting  Cardiovascular status: hemodynamically stable  Respiratory status: spontaneous ventilation and room air  Hydration status: stable  Pain management: satisfactory to patient    No notable events documented.

## 2025-05-14 NOTE — OP NOTE
OPERATIVE REPORT    PATIENT INFORMATION:    Patient: Angel BeckhamMRN: [id number] SSN: [id number]  YOB: 1955 Age: 69 y.o. Sex: male      LOCATION OF SURGERY: [address] [address] [phone]  DATE OF SURGERY:  05/14/25      PRE-OPERATIVE DIAGNOSIS: Cataract Left eye.  POST OPERATIVE DIAGNOSIS: Cataract Left eye.      PROCEDURE PERFORMED: Phacoemulsification with intraocular lens Left eye.  SURGEON: Carolina Gar M.D.  ANESTHESIA: Monitored anesthesia.  COMPLICATIONS: None.  BLOOD LOSS: None  SPECIMEN: None      INDICATIONS FOR PROCEDURE:  This is a 69 y.o. year old male with progressively decreasing vision in the left eye. Risks, benefits and alternatives of surgery were explained at length with the patient and informed consent was obtained.       PROCEDURE:  The patient was met in the pre-operative holding area where confirmation was made that the left eye was to be operated on and surgical eye was marked. The patient was taken to the operating room where anesthesia staff was present to give temporary sedation. Following the instillation of topical tetracaine drops to the operative eye the patient was then prepped and draped in the usual sterile fashion for ophthalmic surgery. The lid speculum was placed and the lids were retracted.  A paracentesis was made through the clear cornea, shugarcaine was injected in the eye for improved dilation. The anterior chamber was deepened with viscoelastic material. A 2.4 mm keratome was used to make a self-sealing clear corneal incision. Capsulorrhexis was initiated with a cystotome and completed with Utrata forceps without difficulty. Hydrodissection was completed and good fluid wave was visualized. Phacoemulsification was initiated and completed in a divide and conquer fashion without difficulty. Irrigation and aspiration was used to remove the residual cortical material from the capsular bag and then the

## 2025-05-14 NOTE — H&P
Day of Surgery H&P:  Angel Beckham was seen and examined.  There have been no significant clinical changes since the completion of the exam in the office.  Pt continues to complain of persistent poor vision and/or glare in planned operative eye affecting ability to perform basic ADLs (such as reading or driving at night).        Past Medical History:   Diagnosis Date    Back pain     Cervical spondylosis     Gurdeep Gilliland    Chronic kidney disease (CKD) stage G3a/A2, moderately decreased glomerular filtration rate (GFR) between 45-59 mL/min/1.73 square meter and albuminuria creatinine ratio between  mg/g ([facility])     Cocaine abuse ([facility])     DVT (deep venous thrombosis) ([facility]) 2017    Erectile dysfunction     Vernon Aparicio    GERD (gastroesophageal reflux disease)     Hypertension     Hypothyroidism     Incontinence 12/10/2020    fecal and urinary, Vernon Aparicio    Marijuana abuse     Osteoarthritis     left hip, [place]ill [place]    Prostate cancer ([facility])     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 Authorizing Provider   tadalafil (CIALIS) 20 MG tablet Take 1 tablet by mouth daily as needed for Erectile Dysfunction 6/11/24  Yes Ed Dowling PA-C   tolterodine (DETROL LA) 4 MG extended release capsule Take 1 capsule by mouth daily 6/11/24  Yes Ed Dowling PA-C   tadalafil (CIALIS) 5 MG tablet Take 1 tablet by mouth daily 6/11/24  Yes Ed Dowling PA-C   levothyroxine (SYNTHROID) 75 MCG tablet Take 1 tablet by mouth Daily 4/1/24  Yes Provider, MD Bennie   metoprolol tartrate (LOPRESSOR) 25 MG tablet Take 1 tablet by mouth 2 times daily   Yes Provider,

## 2025-07-01 ENCOUNTER — APPOINTMENT (OUTPATIENT)
Age: 70
End: 2025-07-01
Payer: MEDICARE

## 2025-07-01 ENCOUNTER — HOSPITAL ENCOUNTER (EMERGENCY)
Age: 70
Discharge: HOME | End: 2025-07-01
Attending: EMERGENCY MEDICINE
Payer: MEDICARE

## 2025-07-01 VITALS
SYSTOLIC BLOOD PRESSURE: 169 MMHG | HEIGHT: 71 IN | BODY MASS INDEX: 21.56 KG/M2 | OXYGEN SATURATION: 98 % | WEIGHT: 154 LBS | DIASTOLIC BLOOD PRESSURE: 77 MMHG | RESPIRATION RATE: 16 BRPM | TEMPERATURE: 97.7 F | HEART RATE: 55 BPM

## 2025-07-01 DIAGNOSIS — M19.031 OSTEOARTHRITIS OF RIGHT WRIST, UNSPECIFIED OSTEOARTHRITIS TYPE: Primary | ICD-10-CM

## 2025-07-01 PROCEDURE — 99284 EMERGENCY DEPT VISIT MOD MDM: CPT

## 2025-07-01 PROCEDURE — 73110 X-RAY EXAM OF WRIST: CPT

## 2025-07-01 PROCEDURE — 96372 THER/PROPH/DIAG INJ SC/IM: CPT

## 2025-07-01 PROCEDURE — 6360000002 HC RX W HCPCS: Performed by: EMERGENCY MEDICINE

## 2025-07-01 RX ORDER — KETOROLAC TROMETHAMINE 30 MG/ML
30 INJECTION, SOLUTION INTRAMUSCULAR; INTRAVENOUS
Status: COMPLETED | OUTPATIENT
Start: 2025-07-01 | End: 2025-07-01

## 2025-07-01 RX ORDER — KETOROLAC TROMETHAMINE 10 MG/1
10 TABLET, FILM COATED ORAL EVERY 8 HOURS PRN
Qty: 15 TABLET | Refills: 0 | Status: SHIPPED | OUTPATIENT
Start: 2025-07-01

## 2025-07-01 RX ADMIN — KETOROLAC TROMETHAMINE 30 MG: 30 INJECTION, SOLUTION INTRAMUSCULAR at 10:11

## 2025-07-01 ASSESSMENT — PAIN SCALES - GENERAL
PAINLEVEL_OUTOF10: 8
PAINLEVEL_OUTOF10: 8

## 2025-07-01 ASSESSMENT — PAIN DESCRIPTION - LOCATION: LOCATION: WRIST

## 2025-07-01 ASSESSMENT — LIFESTYLE VARIABLES
HOW MANY STANDARD DRINKS CONTAINING ALCOHOL DO YOU HAVE ON A TYPICAL DAY: 3 OR 4
HOW OFTEN DO YOU HAVE A DRINK CONTAINING ALCOHOL: 2-3 TIMES A WEEK

## 2025-07-01 ASSESSMENT — PAIN DESCRIPTION - ORIENTATION: ORIENTATION: RIGHT;LEFT

## 2025-07-01 ASSESSMENT — PAIN DESCRIPTION - DESCRIPTORS: DESCRIPTORS: ACHING

## 2025-07-01 ASSESSMENT — PAIN - FUNCTIONAL ASSESSMENT: PAIN_FUNCTIONAL_ASSESSMENT: 0-10

## 2025-07-01 NOTE — ED TRIAGE NOTES
Pt to ER c/o right wrist pain and swelling that yesterday and reports that today his left wrist is starting to swell and be painful as well. Redness noted to right wrist

## 2025-07-01 NOTE — ED PROVIDER NOTES
MG CAPSULE    Take 1 capsule by mouth 3 times daily.    LEVOTHYROXINE (SYNTHROID) 75 MCG TABLET    Take 1 tablet by mouth Daily    METOPROLOL TARTRATE (LOPRESSOR) 25 MG TABLET    Take 1 tablet by mouth 2 times daily    MULTIPLE VITAMINS-MINERALS (ONE-A-DAY MENS 50+ PO)    Take by mouth    OMEPRAZOLE (PRILOSEC) 40 MG DELAYED RELEASE CAPSULE    TAKE 1 CAPSULE BY MOUTH EVERY DAY 30 MINUTES BEFORE BREAKFAST    OXYCODONE-ACETAMINOPHEN (PERCOCET) 5-325 MG PER TABLET    Take 1 tablet by mouth every 6 hours as needed.    TADALAFIL (CIALIS) 20 MG TABLET    Take 1 tablet by mouth daily as needed for Erectile Dysfunction    TADALAFIL (CIALIS) 5 MG TABLET    Take 1 tablet by mouth daily    TOLTERODINE (DETROL LA) 4 MG EXTENDED RELEASE CAPSULE    Take 1 capsule by mouth daily    TRAZODONE (DESYREL) 50 MG TABLET    Take 1 tablet by mouth nightly Indications: sleep aid       SCREENINGS               No data recorded         PHYSICAL EXAM      Vitals:    07/01/25 0946   BP: (!) 169/77   Pulse: 55   Resp: 16   Temp: 97.7 °F (36.5 °C)   TempSrc: Oral   SpO2: 98%   Weight: 69.9 kg (154 lb)   Height: 1.803 m (5' 11\")     Physical Exam    Nursing notes and vital signs reviewed    Constitutional: Non toxic appearing,no distress  Head: Normocephalic, Atraumatic  Eyes: EOMI  Neck: Supple  Cardiovascular: Regular rate and rhythm, no murmurs, rubs, or gallops  Chest: Normal work of breathing and chest excursion bilaterally  Lungs: Clear to ausculation bilaterally  Abdomen: Soft, non tender, non distended, normoactive bowel sounds  Back: No evidence of trauma or deformity  Extremities: There is some swelling and slight erythema right wrist.  No easily palpable mass or cyst.  The wrist with full range of motion with pain and hand is neurovascularly intact.  Skin: Warm and dry, normal cap refill  Neuro: Alert and appropriate, CN intact, normal speech, strength and sensation full and symmetric bilaterally, normal gait, normal

## (undated) DEVICE — GOWN,AURORA,FABRIC-REINFORCED,X-LARGE: Brand: MEDLINE

## (undated) DEVICE — SOLUTION IV 1000ML LAC RINGERS PH 6.5 INJ USP VIAFLX PLAS

## (undated) DEVICE — ADHESIVE SKIN CLOSURE WND 8.661X1.5 IN 22 CM LIQUIBAND SECUR

## (undated) DEVICE — NDL CNTR 40CT FM MAG: Brand: MEDLINE INDUSTRIES, INC.

## (undated) DEVICE — GLOVE SURG SZ 85 L12IN THK75MIL DK GRN LTX FREE

## (undated) DEVICE — SOLUTION IRRIG 250ML STRL H2O PLAS POUR BTL USP

## (undated) DEVICE — SYRINGE MED 25GA 3ML L5/8IN SUBQ PLAS W/ DETACH NDL SFTY

## (undated) DEVICE — SNARE POLYP M W27MMXL240CM OVL STIFF DISP CAPTIVATOR

## (undated) DEVICE — ENDOSCOPY PUMP TUBING/ CAP SET: Brand: ERBE

## (undated) DEVICE — GOWN PLASTIC FILM THMBHKS UNIV BLUE: Brand: CARDINAL HEALTH

## (undated) DEVICE — GLOVE,SURG,SENSICARE SLT,LF,PF,7: Brand: MEDLINE

## (undated) DEVICE — CATHETER SUCT TR FL TIP 14FR W/ O CTRL

## (undated) DEVICE — GLOVE SURG SZ 75 L12IN FNGR THK79MIL GRN LTX FREE

## (undated) DEVICE — FORCEPS BX L240CM JAW DIA2.8MM L CAP W/ NDL MIC MESH TOOTH

## (undated) DEVICE — UNDERPAD INCONT W23XL36IN STD BLU POLYPR BK FLUF SFT

## (undated) DEVICE — SYRINGE 20ML LL S/C 50

## (undated) DEVICE — SYRINGE MED 10ML LUERLOCK TIP W/O SFTY DISP

## (undated) DEVICE — HYPODERMIC SAFETY NEEDLE: Brand: MAGELLAN

## (undated) DEVICE — HANDPIECE SET WITH HIGH FLOW TIP AND SUCTION TUBE: Brand: INTERPULSE

## (undated) DEVICE — APPLICATOR MEDICATED 26 CC SOLUTION HI LT ORNG CHLORAPREP

## (undated) DEVICE — CANNULA NSL AD TBNG L14FT STD PVC O2 CRV CONN NONFLARED NSL

## (undated) DEVICE — THE CANADY HYBRID PLASMA SCALPEL IS AN ELECTROSURGICAL PLASMA SCALPEL THAT USES AN 85MM BENDABLE PADDLE BLADE TIP. THE ELECTROSURGICAL PLASMA SCALPEL IS USED TO SIMULTANEOUSLY CUT AND COAGULATE BIOLOGICAL TISSUE.: Brand: CANADY HYBRID PLASMA PADDLE BLADE

## (undated) DEVICE — GAUZE,SPONGE,4"X4",16PLY,STRL,LF,10/TRAY: Brand: MEDLINE

## (undated) DEVICE — SOLUTION IRRIG 500ML 0.9% SOD CHLO USP POUR PLAS BTL

## (undated) DEVICE — SUTURE VCRL + SZ 1 L36IN ABSRB UD L36MM CT-1 1/2 CIR VCP947H

## (undated) DEVICE — GLOVE,SURG,SENSICARE SLT,LF,PF,6.5: Brand: MEDLINE

## (undated) DEVICE — GLOVE ORANGE PI 8   MSG9080

## (undated) DEVICE — SOLUTION IV 250ML 0.9% SOD CHL CLR INJ FLX BG CONT PRT CLSR

## (undated) DEVICE — SUTURE VCRL + SZ 2-0 L36IN ABSRB UD L36MM CT-1 1/2 CIR VCP945H

## (undated) DEVICE — BLUNTFILL: Brand: MONOJECT

## (undated) DEVICE — SKIN MARKER,REGULAR TIP WITH RULER: Brand: DEVON

## (undated) DEVICE — CANNULA ORIG TL CLR W FOAM CUSHIONS AND 14FT SUPL TB 3 CHN

## (undated) DEVICE — GLOVE SURG SZ 8 L12IN THK75MIL DK GRN LTX FREE

## (undated) DEVICE — SOLUTION IRRIG 1000ML H2O STRL BLT

## (undated) DEVICE — SYRINGE MED 50ML LUERSLIP TIP

## (undated) DEVICE — MEDI-VAC NON-CONDUCTIVE SUCTION TUBING: Brand: CARDINAL HEALTH

## (undated) DEVICE — DRESSING ALG W4XL8IN AG FOAM SUPERABSORBENT SIL ANTIMIC

## (undated) DEVICE — LINER SUCT CANSTR 3000CC PLAS SFT PRE ASSEMB W/OUT TBNG W/

## (undated) DEVICE — STRYKER PERFORMANCE SERIES SAGITTAL BLADE: Brand: STRYKER PERFORMANCE SERIES

## (undated) DEVICE — YANKAUER,SMOOTH HANDLE,HIGH CAPACITY: Brand: MEDLINE INDUSTRIES, INC.

## (undated) DEVICE — GARMENT,MEDLINE,DVT,INT,CALF,MED, GEN2: Brand: MEDLINE

## (undated) DEVICE — GOWN,SIRUS,NONRNF,SETINSLV,XL,20/CS: Brand: MEDLINE

## (undated) DEVICE — PACK PROCEDURE SURG ANTR HIP